# Patient Record
Sex: MALE | Race: WHITE | NOT HISPANIC OR LATINO | URBAN - METROPOLITAN AREA
[De-identification: names, ages, dates, MRNs, and addresses within clinical notes are randomized per-mention and may not be internally consistent; named-entity substitution may affect disease eponyms.]

---

## 2017-01-17 ENCOUNTER — EMERGENCY (EMERGENCY)
Facility: HOSPITAL | Age: 82
LOS: 1 days | End: 2017-01-17
Payer: MEDICARE

## 2017-01-17 PROCEDURE — 74176 CT ABD & PELVIS W/O CONTRAST: CPT | Mod: 26

## 2017-01-17 PROCEDURE — 99284 EMERGENCY DEPT VISIT MOD MDM: CPT

## 2018-11-20 ENCOUNTER — OUTPATIENT (OUTPATIENT)
Dept: OUTPATIENT SERVICES | Facility: HOSPITAL | Age: 83
LOS: 1 days | End: 2018-11-20

## 2019-02-26 ENCOUNTER — OUTPATIENT (OUTPATIENT)
Dept: OUTPATIENT SERVICES | Facility: HOSPITAL | Age: 84
LOS: 1 days | End: 2019-02-26

## 2019-06-20 ENCOUNTER — OUTPATIENT (OUTPATIENT)
Dept: OUTPATIENT SERVICES | Facility: HOSPITAL | Age: 84
LOS: 1 days | End: 2019-06-20

## 2019-09-05 ENCOUNTER — EMERGENCY (EMERGENCY)
Facility: HOSPITAL | Age: 84
LOS: 1 days | End: 2019-09-05
Admitting: EMERGENCY MEDICINE

## 2020-07-24 ENCOUNTER — OUTPATIENT (OUTPATIENT)
Dept: OUTPATIENT SERVICES | Facility: HOSPITAL | Age: 85
LOS: 1 days | End: 2020-07-24

## 2020-10-22 ENCOUNTER — OUTPATIENT (OUTPATIENT)
Dept: OUTPATIENT SERVICES | Facility: HOSPITAL | Age: 85
LOS: 1 days | End: 2020-10-22

## 2021-02-09 ENCOUNTER — OUTPATIENT (OUTPATIENT)
Dept: OUTPATIENT SERVICES | Facility: HOSPITAL | Age: 86
LOS: 1 days | End: 2021-02-09

## 2021-07-28 ENCOUNTER — APPOINTMENT (OUTPATIENT)
Dept: OPHTHALMOLOGY | Facility: CLINIC | Age: 86
End: 2021-07-28

## 2021-08-17 ENCOUNTER — OUTPATIENT (OUTPATIENT)
Dept: OUTPATIENT SERVICES | Facility: HOSPITAL | Age: 86
LOS: 1 days | End: 2021-08-17

## 2021-08-17 PROBLEM — Z00.00 ENCOUNTER FOR PREVENTIVE HEALTH EXAMINATION: Status: ACTIVE | Noted: 2021-08-17

## 2021-08-23 ENCOUNTER — APPOINTMENT (OUTPATIENT)
Dept: CT IMAGING | Facility: CLINIC | Age: 86
End: 2021-08-23
Payer: MEDICARE

## 2021-08-23 PROCEDURE — 70450 CT HEAD/BRAIN W/O DYE: CPT

## 2021-08-25 ENCOUNTER — NON-APPOINTMENT (OUTPATIENT)
Age: 86
End: 2021-08-25

## 2021-08-25 ENCOUNTER — APPOINTMENT (OUTPATIENT)
Dept: CARDIOLOGY | Facility: CLINIC | Age: 86
End: 2021-08-25
Payer: MEDICARE

## 2021-08-25 VITALS
OXYGEN SATURATION: 96 % | DIASTOLIC BLOOD PRESSURE: 62 MMHG | HEIGHT: 67 IN | HEART RATE: 88 BPM | WEIGHT: 201 LBS | SYSTOLIC BLOOD PRESSURE: 110 MMHG | BODY MASS INDEX: 31.55 KG/M2

## 2021-08-25 VITALS — DIASTOLIC BLOOD PRESSURE: 62 MMHG | SYSTOLIC BLOOD PRESSURE: 112 MMHG

## 2021-08-25 DIAGNOSIS — F32.9 MAJOR DEPRESSIVE DISORDER, SINGLE EPISODE, UNSPECIFIED: ICD-10-CM

## 2021-08-25 DIAGNOSIS — Z82.49 FAMILY HISTORY OF ISCHEMIC HEART DISEASE AND OTHER DISEASES OF THE CIRCULATORY SYSTEM: ICD-10-CM

## 2021-08-25 DIAGNOSIS — Z78.9 OTHER SPECIFIED HEALTH STATUS: ICD-10-CM

## 2021-08-25 PROCEDURE — 93000 ELECTROCARDIOGRAM COMPLETE: CPT

## 2021-08-25 PROCEDURE — 99205 OFFICE O/P NEW HI 60 MIN: CPT

## 2021-08-25 RX ORDER — SIMVASTATIN 20 MG/1
20 TABLET, FILM COATED ORAL
Qty: 90 | Refills: 0 | Status: DISCONTINUED | COMMUNITY
End: 2021-08-25

## 2021-08-25 NOTE — HISTORY OF PRESENT ILLNESS
[FreeTextEntry1] : \par 85 yo M with hx as detailed in active problem list here for initial outpatient cv evaluation for near syncope. details in HPI. Had GI virus 2021 and was hypovolemic and lightheadedness no syncope. 3 times have been when standing up from sitting except one time was during walking when hot outside. Now hydrating and since that has not had any recurrence last episode 4 weeks ago. needing alfluzosin for BPH. was fatigued now improved. intermittent dyspnea.\par \par \par ORTHOSTATICS TODAY:\par Supine 130/62 HR 74 \par Sittin/64 HR 78\par Standin/60 HR 78\par \par \par OTHER TESTI/NG:\par reports carotid duplex unremarkable\par Labs 21: grossly normal CMP/ESR/CMP\par reports a1c 6.0. tchol 174. trig elevated 10/2020\par EKG LVH PACs NSR\par CT brain without significant abnormality. age related changes

## 2021-08-25 NOTE — DISCUSSION/SUMMARY
[FreeTextEntry1] : \par 85 yo M with hx as detailed in active problem list here for initial outpatient cv evaluation for near syncope. details in HPI. intermittent dyspnea.\par \par \par # Lightheadedness/near syncope: in setting of hypovolemia each time. orthostatic in nature. today orthostatics normal when hydrated\par - I will obtain a TTE to rule out structural CV abnormality\par - needs alfuzosin; understands alpha 1 blockade effects\par \par # Dyspnea:\par - TTE\par - NST\par \par # HTN: well controlled. if persistent sx, can stop HCTZ component. orthostatics normal. stay hydrated.\par \par # HLD with uncontrolled triglycerides:\par - switch simva 20 to atorva 40, recheck in 3-6 months\par - diet/weight loss/discuss home sleep study\par \par # NIDDM2: very well controlled. continue regimen\par \par \par Return after testing. ER precautions given to patient.\par

## 2021-08-25 NOTE — PHYSICAL EXAM
[Well Developed] : well developed [Well Nourished] : well nourished [No Acute Distress] : no acute distress [Normal Conjunctiva] : normal conjunctiva [Normal Venous Pressure] : normal venous pressure [No Carotid Bruit] : no carotid bruit [Normal S1, S2] : normal S1, S2 [No Rub] : no rub [No Gallop] : no gallop [Clear Lung Fields] : clear lung fields [Good Air Entry] : good air entry [No Respiratory Distress] : no respiratory distress  [Soft] : abdomen soft [Non Tender] : non-tender [Normal Bowel Sounds] : normal bowel sounds [Normal Gait] : normal gait [No Edema] : no edema [No Cyanosis] : no cyanosis [No Clubbing] : no clubbing [No Varicosities] : no varicosities [No Rash] : no rash [No Skin Lesions] : no skin lesions [Moves all extremities] : moves all extremities [No Focal Deficits] : no focal deficits [Normal Speech] : normal speech [Alert and Oriented] : alert and oriented [Normal memory] : normal memory [de-identified] : MEG

## 2021-09-10 ENCOUNTER — APPOINTMENT (OUTPATIENT)
Dept: CARDIOLOGY | Facility: CLINIC | Age: 86
End: 2021-09-10
Payer: MEDICARE

## 2021-09-10 PROCEDURE — 93015 CV STRESS TEST SUPVJ I&R: CPT

## 2021-09-10 PROCEDURE — 78452 HT MUSCLE IMAGE SPECT MULT: CPT

## 2021-09-10 PROCEDURE — A9502: CPT

## 2021-09-10 PROCEDURE — 93306 TTE W/DOPPLER COMPLETE: CPT

## 2021-09-13 ENCOUNTER — NON-APPOINTMENT (OUTPATIENT)
Age: 86
End: 2021-09-13

## 2021-09-20 ENCOUNTER — APPOINTMENT (OUTPATIENT)
Dept: CARDIOLOGY | Facility: CLINIC | Age: 86
End: 2021-09-20
Payer: MEDICARE

## 2021-09-20 VITALS
DIASTOLIC BLOOD PRESSURE: 66 MMHG | BODY MASS INDEX: 32.18 KG/M2 | WEIGHT: 205 LBS | OXYGEN SATURATION: 97 % | SYSTOLIC BLOOD PRESSURE: 124 MMHG | HEART RATE: 65 BPM | HEIGHT: 67 IN | TEMPERATURE: 96.9 F | RESPIRATION RATE: 14 BRPM

## 2021-09-20 PROCEDURE — 99215 OFFICE O/P EST HI 40 MIN: CPT

## 2021-09-20 RX ORDER — CHOLESTYRAMINE 4 G/9G
4 POWDER, FOR SUSPENSION ORAL 3 TIMES DAILY
Refills: 0 | Status: ACTIVE | COMMUNITY

## 2021-09-20 NOTE — PHYSICAL EXAM
[Well Developed] : well developed [Well Nourished] : well nourished [No Acute Distress] : no acute distress [Normal Conjunctiva] : normal conjunctiva [Normal Venous Pressure] : normal venous pressure [Normal S1, S2] : normal S1, S2 [No Rub] : no rub [Clear Lung Fields] : clear lung fields [Good Air Entry] : good air entry [No Respiratory Distress] : no respiratory distress  [Soft] : abdomen soft [Non Tender] : non-tender [Normal Bowel Sounds] : normal bowel sounds [Normal Gait] : normal gait [No Edema] : no edema [No Cyanosis] : no cyanosis [No Clubbing] : no clubbing [No Varicosities] : no varicosities [No Rash] : no rash [No Skin Lesions] : no skin lesions [Moves all extremities] : moves all extremities [No Focal Deficits] : no focal deficits [Normal Speech] : normal speech [Alert and Oriented] : alert and oriented [Normal memory] : normal memory [de-identified] : MEG

## 2021-09-20 NOTE — HISTORY OF PRESENT ILLNESS
[FreeTextEntry1] : \par 87 yo M with hx as detailed in active problem list here for results follow up.\par \par Testing significant for: \par labwork: trig 416 unable to calculate ldl. a1c 6. normal tft and bnp\par 2021 echo and nst without evidence of structural cv abnormality or ischemia. g1dd\par \par no recurrence of near syncope after stopping/decreasing afluzosin. \par \par \par In past "Had GI virus 2021 and was hypovolemic and lightheadedness no syncope. 3 times have been when standing up from sitting except one time was during walking when hot outside. Now hydrating and since that has not had any recurrence last episode 4 weeks ago. needing alfluzosin for BPH. was fatigued now improved. intermittent dyspnea."\par \par \par ORTHOSTATICS LAST VISIT:\par Supine 130/62 HR 74 \par Sittin/64 HR 78\par Standin/60 HR 78\par \par \par OTHER TESTI/NG:\par labwork: trig 416 unable to calculate ldl. a1c 6. normal tft and bnp\par 2021 echo and nst without evidence of structural cv abnormality or ischemia. g1dd\par Labs 21: grossly normal CMP/ESR/CMP\par reports a1c 6.0. tchol 174. trig elevated 10/2020\par EKG LVH PACs NSR\par CT brain without significant abnormality. age related changes

## 2021-09-20 NOTE — DISCUSSION/SUMMARY
[FreeTextEntry1] : \par 85 yo M with hx as detailed in active problem list here for results follow up.\par labwork: trig 416 unable to calculate ldl. a1c 6. normal tft and bnp\par 9/2021 echo and nst without evidence of structural cv abnormality or ischemia. g1dd\par no recurrence of near syncope after stopping/decreasing afluzosin. \par \par \par # HLD with uncontrolled triglycerides:\par - switched to atorva 40 and added fenofibrate 160 and recheck in 3 months. aspirin 81/ppi.\par - diet/weight loss/discuss home sleep study\par \par # Lightheadedness/near syncope: in setting of hypovolemia each time. orthostatic in nature. orthostatics normal when hydrated\par - decreased afluzosin; understands alpha 1 blockade effects\par \par # HTN: well controlled. if persistent sx, can stop HCTZ component. orthostatics normal. stay hydrated.\par \par # NIDDM2: very well controlled. continue regimen\par \par \par Return in 3 months with labwork. ER precautions given to patient.\par

## 2021-12-06 ENCOUNTER — NON-APPOINTMENT (OUTPATIENT)
Age: 86
End: 2021-12-06

## 2021-12-06 ENCOUNTER — APPOINTMENT (OUTPATIENT)
Dept: CARDIOLOGY | Facility: CLINIC | Age: 86
End: 2021-12-06
Payer: MEDICARE

## 2021-12-06 VITALS
TEMPERATURE: 97.1 F | OXYGEN SATURATION: 97 % | DIASTOLIC BLOOD PRESSURE: 50 MMHG | HEIGHT: 67 IN | HEART RATE: 62 BPM | BODY MASS INDEX: 31.39 KG/M2 | SYSTOLIC BLOOD PRESSURE: 102 MMHG | WEIGHT: 200 LBS

## 2021-12-06 PROCEDURE — 99214 OFFICE O/P EST MOD 30 MIN: CPT

## 2021-12-06 PROCEDURE — 93880 EXTRACRANIAL BILAT STUDY: CPT

## 2021-12-06 RX ORDER — CIPROFLOXACIN HYDROCHLORIDE 250 MG/1
250 TABLET, FILM COATED ORAL
Qty: 10 | Refills: 0 | Status: DISCONTINUED | COMMUNITY
Start: 2021-06-22 | End: 2021-12-06

## 2021-12-06 RX ORDER — CITALOPRAM 20 MG/1
20 TABLET, FILM COATED ORAL DAILY
Refills: 0 | Status: ACTIVE | COMMUNITY

## 2021-12-06 RX ORDER — FINASTERIDE 5 MG/1
5 TABLET, FILM COATED ORAL DAILY
Refills: 0 | Status: ACTIVE | COMMUNITY
Start: 2021-11-24

## 2021-12-06 RX ORDER — LOSARTAN POTASSIUM AND HYDROCHLOROTHIAZIDE 12.5; 1 MG/1; MG/1
100-12.5 TABLET ORAL DAILY
Refills: 0 | Status: DISCONTINUED | COMMUNITY
End: 2021-12-06

## 2021-12-06 RX ORDER — PAROXETINE HYDROCHLORIDE 20 MG/1
20 TABLET, FILM COATED ORAL DAILY
Refills: 0 | Status: DISCONTINUED | COMMUNITY
End: 2021-12-06

## 2021-12-06 RX ORDER — ALFUZOSIN HYDROCHLORIDE 10 MG/1
10 TABLET, EXTENDED RELEASE ORAL DAILY
Refills: 0 | Status: DISCONTINUED | COMMUNITY
End: 2021-12-06

## 2021-12-06 NOTE — PHYSICAL EXAM
[Well Developed] : well developed [Well Nourished] : well nourished [No Acute Distress] : no acute distress [Normal Conjunctiva] : normal conjunctiva [Normal Venous Pressure] : normal venous pressure [Normal S1, S2] : normal S1, S2 [No Rub] : no rub [Clear Lung Fields] : clear lung fields [Good Air Entry] : good air entry [No Respiratory Distress] : no respiratory distress  [Soft] : abdomen soft [Non Tender] : non-tender [Normal Bowel Sounds] : normal bowel sounds [Normal Gait] : normal gait [No Edema] : no edema [No Cyanosis] : no cyanosis [No Clubbing] : no clubbing [No Varicosities] : no varicosities [No Rash] : no rash [No Skin Lesions] : no skin lesions [Moves all extremities] : moves all extremities [No Focal Deficits] : no focal deficits [Normal Speech] : normal speech [Alert and Oriented] : alert and oriented [Normal memory] : normal memory [de-identified] : MEG

## 2021-12-06 NOTE — HISTORY OF PRESENT ILLNESS
[FreeTextEntry1] : \par 85 yo M with hx as detailed in active problem list here for 3-month follow-up.\par \par Surveillance carotid ultrasound today: inadequate study, tortuous\par Interim lab work: much improved lipid profile. cr 1.1. normal lfts. LDL 58.  Intermittently has near syncopal sensation without any cardiovascular symptoms preceding or post.  No syncope or fall.\par \par He initially was seen for near syncope which resolved after stopping/decreasing afluzosin.  Orthostatics resolved after this.\par \par In past "Had GI virus 7/2021 and was hypovolemic and lightheadedness no syncope. 3 times have been when standing up from sitting except one time was during walking when hot outside. Now hydrating and since that has not had any recurrence last episode 4 weeks ago. needing alfluzosin for BPH. was fatigued now improved. intermittent dyspnea."\par \par \par OTHER TESTI/NG:\par Lab work 12/2021: Creatinine 1.1.  Normal LFT.  A1c 6.4.  Triglycerides 140.  LDL 58.\par 12/21 Carotid ultrasound: poor \par labwork: trig 416 unable to calculate ldl. a1c 6. normal tft and bnp\par 9/2021 echo and nst without evidence of structural cv abnormality or ischemia. g1dd\par Labs 8/17/21: grossly normal CMP/ESR/CMP\par reports a1c 6.0. tchol 174. trig elevated 10/2020\par EKG LVH PACs NSR\par CT brain without significant abnormality. age related changes

## 2021-12-06 NOTE — DISCUSSION/SUMMARY
[FreeTextEntry1] : \par 85 yo M with hx as detailed in active problem list here for 3-month follow-up.\par \par # HLD, hypertriglyceridemia: Prior uncontrolled now improved\par -Continue the recently switched atorva 40 and fenofibrate 160. aspirin 81/ppi.\par - diet/weight loss\par -Ordered home sleep study\par \par # Lightheadedness/near syncope: in setting of hypovolemia each time. orthostatic in nature. orthostatics normal when hydrated\par -Stop afluzoin; understands alpha 1 blockade effects\par -Ordered 14-day Zio patch although symptoms occur very rarely\par -Stop hydrochlorothiazide\par \par # HTN: well controlled.  Stop hydrochlorothiazide with combination of losartan 100.  We will continue losartan 100 monotherapy.  Orthostatics normal. stay hydrated.\par \par # NIDDM2: very well controlled. continue regimen\par \par \par Return in 6 months.  Follow patch and sleep study results in interim. Forward note to PCP Dr. Mai.\par

## 2021-12-15 ENCOUNTER — APPOINTMENT (OUTPATIENT)
Age: 86
End: 2021-12-15
Payer: MEDICARE

## 2021-12-15 ENCOUNTER — OUTPATIENT (OUTPATIENT)
Dept: OUTPATIENT SERVICES | Facility: HOSPITAL | Age: 86
LOS: 1 days | End: 2021-12-15
Payer: MEDICARE

## 2021-12-15 DIAGNOSIS — G47.33 OBSTRUCTIVE SLEEP APNEA (ADULT) (PEDIATRIC): ICD-10-CM

## 2021-12-15 PROCEDURE — G0399: CPT

## 2021-12-15 PROCEDURE — 95806 SLEEP STUDY UNATT&RESP EFFT: CPT

## 2021-12-15 PROCEDURE — ZZZZZ: CPT

## 2021-12-29 ENCOUNTER — NON-APPOINTMENT (OUTPATIENT)
Age: 86
End: 2021-12-29

## 2022-01-05 ENCOUNTER — APPOINTMENT (OUTPATIENT)
Dept: ELECTROPHYSIOLOGY | Facility: CLINIC | Age: 87
End: 2022-01-05
Payer: MEDICARE

## 2022-01-05 ENCOUNTER — NON-APPOINTMENT (OUTPATIENT)
Age: 87
End: 2022-01-05

## 2022-01-05 VITALS
WEIGHT: 196 LBS | BODY MASS INDEX: 30.76 KG/M2 | HEART RATE: 90 BPM | OXYGEN SATURATION: 97 % | SYSTOLIC BLOOD PRESSURE: 152 MMHG | TEMPERATURE: 97.1 F | DIASTOLIC BLOOD PRESSURE: 70 MMHG | HEIGHT: 67 IN

## 2022-01-05 PROCEDURE — 93000 ELECTROCARDIOGRAM COMPLETE: CPT

## 2022-01-05 PROCEDURE — 99204 OFFICE O/P NEW MOD 45 MIN: CPT

## 2022-01-05 RX ORDER — CITALOPRAM HYDROBROMIDE 10 MG/1
10 TABLET, FILM COATED ORAL DAILY
Refills: 0 | Status: DISCONTINUED | COMMUNITY
Start: 2021-11-29 | End: 2022-01-05

## 2022-01-05 NOTE — PHYSICAL EXAM
[No Acute Distress] : no acute distress [Normal Conjunctiva] : normal conjunctiva [Normal Venous Pressure] : normal venous pressure [Normal S1, S2] : normal S1, S2 [No Murmur] : no murmur [No Rub] : no rub [Clear Lung Fields] : clear lung fields [Good Air Entry] : good air entry [Non Tender] : non-tender [No Edema] : no edema [No Cyanosis] : no cyanosis [No Clubbing] : no clubbing [No Rash] : no rash [No Focal Deficits] : no focal deficits [Alert and Oriented] : alert and oriented

## 2022-01-05 NOTE — DISCUSSION/SUMMARY
[FreeTextEntry1] : Patient is overall feeling well without any symptoms of chest pain, shortness of breath or syncope.  He has this occasional dizziness/lightheadedness when he stands up too quickly and this is improved since discontinuation of diuretic.  He has normal left ventricular function and no evidence of ischemia on a nuclear stress test.\par \par The patient does not complain of palpitations.  He had a Zio patch monitor that showed episodes of tachycardia rate of 180 bpm that appears to be an atrial tachycardia.  He also had short episodes of nonsustained wide-complex tachycardia that slightly different from his baseline morphology and appear to be ventricular.  He is on metoprolol 12.5 mg/day.\par \par We will increase the dose of his metoprolol to 25 mg daily.  We will be mindful of him developing bradycardia.  Patient does not feel the tachycardia episodes at this point and was alerted regarding symptoms.  He has preserved left ventricular function and normal myocardial perfusion scan and is at low risk for sustained VT.\par \par The patient was instructed that if he should have symptoms such as worsening dizziness or lightheadedness to report as which case we might have to consider either pacemaker and/or electrophysiology study and catheter ablation.  At this time the patient would prefer not to have anything invasive done.\par \par

## 2022-01-05 NOTE — HISTORY OF PRESENT ILLNESS
[FreeTextEntry1] : Patient is an 86-year-old man who was seen in evaluation for an NSVT.\par \par He has had a prior history of hypertension for which she is on losartan.  He was previously on hydrochlorothiazide which was discontinued.  He also had a history of diabetes and is on Metformin.\par \par In the past the patient had symptoms of dizziness/lightheadedness when he stands up.  It occurs for very brief moment.  Never occurs while he is sitting or laying flat.  The symptom has improved significantly over the last few months.  His diuretic was discontinued.  He was previously noted to be orthostatic.\par \par He denies any symptoms of chest pain, shortness of breath or palpitations.\par \par He claims his symptoms gotten better since he has learned how to get up slowly.\par \par The patient had an echocardiogram performed on 9/10/2021 that showed EF 55 to 60%, left atrial diameter 4.5 cm with left atrial volume index 36 cc/m², mild mitral regurgitation, mild diastolic dysfunction no pericardial effusion.\par \par He had a previous stress test performed 9/10/2021 no ECG evidence of ischemia and normal myocardial perfusion scan.\par \par The patient had a Zio patch monitor performed for 13 days starting 12/6/2021 to 12/20/2021: It showed heart rates ranging from 47 bpm 297 bpm.  He has had episodes of a nonsustained wide-complex tachycardia rate of 180 bpm.  He is also had another tachycardia that appears to be similar to his baseline rhythm with rates 180 bpm.\par

## 2022-01-05 NOTE — CARDIOLOGY SUMMARY
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

## 2022-01-05 NOTE — REVIEW OF SYSTEMS
[Dizziness] : dizziness [Weight Gain (___ Lbs)] : no recent weight gain [Blurry Vision] : no blurred vision [Sore Throat] : no sore throat [SOB] : no shortness of breath [Dyspnea on exertion] : not dyspnea during exertion [Chest Discomfort] : no chest discomfort [Palpitations] : no palpitations [Syncope] : no syncope [Cough] : no cough [Abdominal Pain] : no abdominal pain [Rash] : no rash [Confusion] : no confusion was observed [Under Stress] : not under stress [Easy Bleeding] : no tendency for easy bleeding

## 2022-04-04 ENCOUNTER — APPOINTMENT (OUTPATIENT)
Dept: CARDIOLOGY | Facility: CLINIC | Age: 87
End: 2022-04-04
Payer: MEDICARE

## 2022-04-04 VITALS
TEMPERATURE: 97.1 F | WEIGHT: 200 LBS | DIASTOLIC BLOOD PRESSURE: 58 MMHG | HEART RATE: 72 BPM | SYSTOLIC BLOOD PRESSURE: 102 MMHG | OXYGEN SATURATION: 95 % | BODY MASS INDEX: 31.39 KG/M2 | HEIGHT: 67 IN

## 2022-04-04 PROCEDURE — 99214 OFFICE O/P EST MOD 30 MIN: CPT

## 2022-04-04 RX ORDER — OMEPRAZOLE 20 MG/1
20 CAPSULE, DELAYED RELEASE ORAL DAILY
Refills: 0 | Status: ACTIVE | COMMUNITY

## 2022-04-04 RX ORDER — FENOFIBRATE 160 MG/1
160 TABLET ORAL
Qty: 90 | Refills: 3 | Status: ACTIVE | COMMUNITY
Start: 2021-09-20

## 2022-04-04 RX ORDER — METFORMIN HYDROCHLORIDE 500 MG/1
500 TABLET, COATED ORAL
Qty: 180 | Refills: 1 | Status: ACTIVE | COMMUNITY

## 2022-04-04 RX ORDER — ATORVASTATIN CALCIUM 40 MG/1
40 TABLET, FILM COATED ORAL
Qty: 90 | Refills: 3 | Status: ACTIVE | COMMUNITY
Start: 2021-08-25

## 2022-04-04 NOTE — PHYSICAL EXAM
[Well Developed] : well developed [Well Nourished] : well nourished [No Acute Distress] : no acute distress [Normal Conjunctiva] : normal conjunctiva [Normal Venous Pressure] : normal venous pressure [Normal S1, S2] : normal S1, S2 [No Rub] : no rub [Clear Lung Fields] : clear lung fields [Good Air Entry] : good air entry [No Respiratory Distress] : no respiratory distress  [Soft] : abdomen soft [Non Tender] : non-tender [Normal Bowel Sounds] : normal bowel sounds [Normal Gait] : normal gait [No Edema] : no edema [No Cyanosis] : no cyanosis [No Clubbing] : no clubbing [No Varicosities] : no varicosities [No Rash] : no rash [No Skin Lesions] : no skin lesions [Moves all extremities] : moves all extremities [No Focal Deficits] : no focal deficits [Normal Speech] : normal speech [Alert and Oriented] : alert and oriented [Normal memory] : normal memory [de-identified] : MEG

## 2022-04-04 NOTE — HISTORY OF PRESENT ILLNESS
[FreeTextEntry1] : \par 88 yo M\par \par 4/2022 VISIT: interim labwork done. seeing sleep medicine for cpap. pending to start. no more dizziness. on bb. saw EP. no angina. \par \par 12/2021 VISIT: "episode of dizziness correlated with a normal heart rhythm which is good news.  He did have some asymptomatic fast heart rates that I spoke to the electrophysiology and they would like to see him within a couple weeks.  I tasked Xiomara. To control his fast heart rates intermittently I recommend starting low-dose metoprolol at 12.5 and lower the losartan to 50 so his blood pressure does not drop.  Otherwise no change in plan.  I sent in both those prescription changes. See sleep medicine doctor and start CPAP."\par \par \par TESTING I REVIEWED TODAY:\par 3/2022 LABWORK: cr 1.14. normal cmp. a1c 5.8. LDL 59. trig improved to 141. \par \par 12/2021: \par Surveillance carotid ultrasound: inadequate study, tortuous\par Interim lab work: much improved lipid profile. cr 1.1. normal lfts. LDL 58.  Intermittently has near syncopal sensation without any cardiovascular symptoms preceding or post.  No syncope or fall.\par \par He initially was seen for near syncope which resolved after stopping/decreasing afluzosin.  Orthostatics resolved after this.\par \par In past "Had GI virus 7/2021 and was hypovolemic and lightheadedness no syncope. 3 times have been when standing up from sitting except one time was during walking when hot outside. Now hydrating and since that has not had any recurrence last episode 4 weeks ago. needing alfluzosin for BPH. was fatigued now improved. intermittent dyspnea."\par \par \par OTHER TESTI/NG:\par Lab work 12/2021: Creatinine 1.1.  Normal LFT.  A1c 6.4.  Triglycerides 140.  LDL 58.\par 12/21 Carotid ultrasound: poor \par labwork: trig 416 unable to calculate ldl. a1c 6. normal tft and bnp\par 9/2021 echo and nst without evidence of structural cv abnormality or ischemia. g1dd\par Labs 8/17/21: grossly normal CMP/ESR/CMP\par reports a1c 6.0. tchol 174. trig elevated 10/2020\par EKG LVH PACs NSR\par CT brain without significant abnormality. age related changes

## 2022-04-04 NOTE — DISCUSSION/SUMMARY
[FreeTextEntry1] : \par 86 yo M\par 4/2022 VISIT: interim labwork done. seeing sleep medicine for cpap. pending to start. no more dizziness. on bb. saw EP. no angina. \par \par \par # GOLDIE, HLD, hypertriglyceridemia: Prior uncontrolled now very optimal. trig was 470+ now 141. \par -Continue the recently switched atorva 40 and fenofibrate 160. aspirin 81/ppi.\par - diet/weight loss\par - seeing Dr. Rosales for CPAP initiation, has GOLDIE \par \par # Lightheadedness/near syncope now resolved: in setting of hypovolemia each time. orthostatic in nature. orthostatics normal when hydrated\par -Stopped afluzoin; understands alpha 1 blockade effects\par -seeing EP\par -Stopped prior hydrochlorothiazide\par \par # HTN: well controlled.  Stopped hydrochlorothiazide with combination of losartan 100.  We will continue losartan 100 and toprol 25.  Orthostatics normal. stay hydrated.\par \par # NIDDM2: very well controlled. continue regimen\par \par \par Return in 6 months.  Forward note to PCP Dr. Mai.\par

## 2022-05-13 ENCOUNTER — APPOINTMENT (OUTPATIENT)
Dept: RADIOLOGY | Facility: CLINIC | Age: 87
End: 2022-05-13
Payer: MEDICARE

## 2022-05-13 PROCEDURE — 71046 X-RAY EXAM CHEST 2 VIEWS: CPT

## 2022-05-19 ENCOUNTER — NON-APPOINTMENT (OUTPATIENT)
Age: 87
End: 2022-05-19

## 2022-06-06 ENCOUNTER — APPOINTMENT (OUTPATIENT)
Dept: CARDIOLOGY | Facility: CLINIC | Age: 87
End: 2022-06-06
Payer: MEDICARE

## 2022-06-06 VITALS
HEIGHT: 67 IN | OXYGEN SATURATION: 96 % | BODY MASS INDEX: 31.08 KG/M2 | HEART RATE: 60 BPM | DIASTOLIC BLOOD PRESSURE: 74 MMHG | TEMPERATURE: 96.3 F | WEIGHT: 198 LBS | SYSTOLIC BLOOD PRESSURE: 164 MMHG

## 2022-06-06 PROCEDURE — 99214 OFFICE O/P EST MOD 30 MIN: CPT

## 2022-06-10 NOTE — PHYSICAL EXAM
[Well Developed] : well developed [Well Nourished] : well nourished [No Acute Distress] : no acute distress [Normal Conjunctiva] : normal conjunctiva [Normal Venous Pressure] : normal venous pressure [Normal S1, S2] : normal S1, S2 [No Rub] : no rub [Clear Lung Fields] : clear lung fields [Good Air Entry] : good air entry [No Respiratory Distress] : no respiratory distress  [Soft] : abdomen soft [Non Tender] : non-tender [Normal Bowel Sounds] : normal bowel sounds [Normal Gait] : normal gait [No Edema] : no edema [No Cyanosis] : no cyanosis [No Clubbing] : no clubbing [No Varicosities] : no varicosities [No Rash] : no rash [No Skin Lesions] : no skin lesions [Moves all extremities] : moves all extremities [No Focal Deficits] : no focal deficits [Normal Speech] : normal speech [Alert and Oriented] : alert and oriented [Normal memory] : normal memory [de-identified] : MEG

## 2022-06-10 NOTE — DISCUSSION/SUMMARY
[FreeTextEntry1] : \par # GOLDIE, HLD, hypertriglyceridemia: Prior uncontrolled now very optimal. trig was 470+ now 141. \par -Continue the recently switched atorva 40 and fenofibrate 160. aspirin 81/ppi. discss stopping aspirin. \par - diet/weight loss\par - seeing Dr. Rosales for CPAP initiation, has GOLDIE, reports it is on backorder\par \par # Lightheadedness/near syncope now resolved: in setting of hypovolemia each time. orthostatic in nature. orthostatics normal when hydrated\par -Stopped afluzoin; understands alpha 1 blockade effects\par -seeing EP\par -Stopped prior hydrochlorothiazide\par \par # HTN: labile.\par - do losartan 50 BID instead of 50 qd\par - off diuretics and pure alpha blockers of any sort\par - keep toprol 25.  Orthostatics normal\par \par # NIDDM2: very well controlled. continue regimen\par \par \par return in 1 month for bp checks. ER precautions given to patient.\par \par

## 2022-06-10 NOTE — HISTORY OF PRESENT ILLNESS
[FreeTextEntry1] : \par 86 yo M\par \par 6/2022: no angina. reports BP is labile. not started cpap, on backorder\par  \par 4/2022 VISIT: interim labwork done. seeing sleep medicine for cpap. pending to start. no more dizziness. on bb. saw EP. no angina. \par \par 12/2021 VISIT: "episode of dizziness correlated with a normal heart rhythm which is good news.  He did have some asymptomatic fast heart rates that I spoke to the electrophysiology and they would like to see him within a couple weeks.  I tasked Xiomara. To control his fast heart rates intermittently I recommend starting low-dose metoprolol at 12.5 and lower the losartan to 50 so his blood pressure does not drop.  Otherwise no change in plan.  I sent in both those prescription changes. See sleep medicine doctor and start CPAP."\par He initially was seen for near syncope which resolved after stopping/decreasing afluzosin.  Orthostatics resolved after this.\par \par \par \par TESTING I REVIEWED TODAY:\par 3/2022 LABWORK: cr 1.14. normal cmp. a1c 5.8. LDL 59. trig improved to 141. \par \par Lab work 12/2021: Creatinine 1.1.  Normal LFT.  A1c 6.4.  Triglycerides 140.  LDL 58.\par 12/21 Carotid ultrasound: poor \par labwork: trig 416 unable to calculate ldl. a1c 6. normal tft and bnp\par 9/2021 echo and nst without evidence of structural cv abnormality or ischemia. g1dd\par Labs 8/17/21: grossly normal CMP/ESR/CMP\par reports a1c 6.0. tchol 174. trig elevated 10/2020\par EKG LVH PACs NSR\par CT brain without significant abnormality. age related changes\par

## 2022-06-28 ENCOUNTER — INPATIENT (INPATIENT)
Facility: HOSPITAL | Age: 87
LOS: 0 days | Discharge: HOME | End: 2022-06-29
Attending: INTERNAL MEDICINE | Admitting: EMERGENCY MEDICINE
Payer: MEDICARE

## 2022-06-28 ENCOUNTER — OUTPATIENT (OUTPATIENT)
Dept: OUTPATIENT SERVICES | Facility: HOSPITAL | Age: 87
LOS: 1 days | End: 2022-06-28

## 2022-06-28 PROCEDURE — 99285 EMERGENCY DEPT VISIT HI MDM: CPT

## 2022-06-28 PROCEDURE — 71045 X-RAY EXAM CHEST 1 VIEW: CPT | Mod: 26

## 2022-06-28 PROCEDURE — 93010 ELECTROCARDIOGRAM REPORT: CPT | Mod: 76

## 2022-06-28 PROCEDURE — 99223 1ST HOSP IP/OBS HIGH 75: CPT

## 2022-06-28 PROCEDURE — 70450 CT HEAD/BRAIN W/O DYE: CPT | Mod: 26,MA

## 2022-06-28 PROCEDURE — 72125 CT NECK SPINE W/O DYE: CPT | Mod: 26,MA

## 2022-06-29 ENCOUNTER — OUTPATIENT (OUTPATIENT)
Dept: OUTPATIENT SERVICES | Facility: HOSPITAL | Age: 87
LOS: 1 days | End: 2022-06-29

## 2022-06-29 PROCEDURE — 93010 ELECTROCARDIOGRAM REPORT: CPT

## 2022-06-29 PROCEDURE — 33285 INSJ SUBQ CAR RHYTHM MNTR: CPT

## 2022-06-29 PROCEDURE — 93306 TTE W/DOPPLER COMPLETE: CPT | Mod: 26

## 2022-07-06 DIAGNOSIS — Z87.891 PERSONAL HISTORY OF NICOTINE DEPENDENCE: ICD-10-CM

## 2022-07-06 DIAGNOSIS — E86.0 DEHYDRATION: ICD-10-CM

## 2022-07-06 DIAGNOSIS — E83.42 HYPOMAGNESEMIA: ICD-10-CM

## 2022-07-06 DIAGNOSIS — E78.5 HYPERLIPIDEMIA, UNSPECIFIED: ICD-10-CM

## 2022-07-06 DIAGNOSIS — G47.33 OBSTRUCTIVE SLEEP APNEA (ADULT) (PEDIATRIC): ICD-10-CM

## 2022-07-06 DIAGNOSIS — Z82.49 FAMILY HISTORY OF ISCHEMIC HEART DISEASE AND OTHER DISEASES OF THE CIRCULATORY SYSTEM: ICD-10-CM

## 2022-07-06 DIAGNOSIS — Z79.84 LONG TERM (CURRENT) USE OF ORAL HYPOGLYCEMIC DRUGS: ICD-10-CM

## 2022-07-06 DIAGNOSIS — E11.9 TYPE 2 DIABETES MELLITUS WITHOUT COMPLICATIONS: ICD-10-CM

## 2022-07-06 DIAGNOSIS — R55 SYNCOPE AND COLLAPSE: ICD-10-CM

## 2022-07-06 DIAGNOSIS — F32.A DEPRESSION, UNSPECIFIED: ICD-10-CM

## 2022-07-06 DIAGNOSIS — I10 ESSENTIAL (PRIMARY) HYPERTENSION: ICD-10-CM

## 2022-07-06 DIAGNOSIS — K58.9 IRRITABLE BOWEL SYNDROME WITHOUT DIARRHEA: ICD-10-CM

## 2022-07-06 DIAGNOSIS — N40.0 BENIGN PROSTATIC HYPERPLASIA WITHOUT LOWER URINARY TRACT SYMPTOMS: ICD-10-CM

## 2022-07-06 DIAGNOSIS — I47.1 SUPRAVENTRICULAR TACHYCARDIA: ICD-10-CM

## 2022-07-06 DIAGNOSIS — Z83.3 FAMILY HISTORY OF DIABETES MELLITUS: ICD-10-CM

## 2022-07-06 DIAGNOSIS — N17.9 ACUTE KIDNEY FAILURE, UNSPECIFIED: ICD-10-CM

## 2022-07-08 ENCOUNTER — APPOINTMENT (OUTPATIENT)
Dept: CARDIOLOGY | Facility: CLINIC | Age: 87
End: 2022-07-08

## 2022-07-08 VITALS
TEMPERATURE: 97.3 F | WEIGHT: 192 LBS | OXYGEN SATURATION: 95 % | SYSTOLIC BLOOD PRESSURE: 144 MMHG | BODY MASS INDEX: 30.13 KG/M2 | DIASTOLIC BLOOD PRESSURE: 68 MMHG | HEART RATE: 67 BPM | HEIGHT: 67 IN

## 2022-07-08 DIAGNOSIS — R42 DIZZINESS AND GIDDINESS: ICD-10-CM

## 2022-07-08 PROCEDURE — 99215 OFFICE O/P EST HI 40 MIN: CPT

## 2022-07-08 RX ORDER — LOSARTAN POTASSIUM 50 MG/1
50 TABLET, FILM COATED ORAL DAILY
Qty: 90 | Refills: 3 | Status: DISCONTINUED | COMMUNITY
Start: 2021-12-06 | End: 2022-07-08

## 2022-07-08 RX ORDER — LOSARTAN POTASSIUM 50 MG/1
50 TABLET, FILM COATED ORAL TWICE DAILY
Refills: 0 | Status: ACTIVE | COMMUNITY

## 2022-07-08 RX ORDER — ASPIRIN 325 MG
81 TABLET ORAL DAILY
Qty: 90 | Refills: 0 | Status: DISCONTINUED | COMMUNITY
Start: 2021-08-25 | End: 2022-07-08

## 2022-07-08 NOTE — PHYSICAL EXAM
[Well Developed] : well developed [Well Nourished] : well nourished [No Acute Distress] : no acute distress [Normal Conjunctiva] : normal conjunctiva [Normal Venous Pressure] : normal venous pressure [Normal S1, S2] : normal S1, S2 [No Rub] : no rub [Clear Lung Fields] : clear lung fields [Good Air Entry] : good air entry [No Respiratory Distress] : no respiratory distress  [Soft] : abdomen soft [Non Tender] : non-tender [Normal Bowel Sounds] : normal bowel sounds [Normal Gait] : normal gait [No Edema] : no edema [No Cyanosis] : no cyanosis [No Clubbing] : no clubbing [No Varicosities] : no varicosities [No Rash] : no rash [No Skin Lesions] : no skin lesions [Moves all extremities] : moves all extremities [No Focal Deficits] : no focal deficits [Normal Speech] : normal speech [Alert and Oriented] : alert and oriented [Normal memory] : normal memory [de-identified] : MEG

## 2022-07-08 NOTE — DISCUSSION/SUMMARY
[FreeTextEntry1] : \par # GOLDIE, HLD, hypertriglyceridemia: Prior uncontrolled now very optimal. trig was 470+ now 141. \par -Continue the recently switched atorva 40 and fenofibrate 160. stop aspirin. \par - diet/weight loss\par - seeing Dr. Rosales for CPAP initiation, has GOLDIE, reports it is on backorder\par \par # Syncope: in setting of hypovolemia each time this time with SVT. orthostatic in nature. orthostatics normal when hydrated\par -Stopped afluzosin and tamsulosin now. on low dose bb. has ILR now.\par -seeing EP\par -Stopped prior hydrochlorothiazide\par \par # HTN: labile.\par - do losartan 50 BID instead of 50 qd\par - off diuretics and pure alpha blockers of any sort\par - keep toprol 12.5.  Orthostatics normal. bp log. \par \par # NIDDM2: very well controlled. continue regimen\par \par \par Return in 3 months. EP in interim. ER precautions given to patient.\par \par

## 2022-07-08 NOTE — HISTORY OF PRESENT ILLNESS
[FreeTextEntry1] : \par 86 yo M\par \par 7/2022: interim[place] [facility] [place] broke with adenosine and dehydration. off tamsulosin now.  ILR put in. echo structurally unremarkable. \par  \par 6/2022: no angina. reports BP is labile. not started cpap, on backorder\par  \par 4/2022 VISIT: interim labwork done. seeing sleep medicine for cpap. pending to start. no more dizziness. on bb. saw EP. no angina. \par \par 12/2021 VISIT: "episode of dizziness correlated with a normal heart rhythm which is good news.  He did have some asymptomatic fast heart rates that I spoke to the electrophysiology and they would like to see him within a couple weeks.  I tasked Xiomara. To control his fast heart rates intermittently I recommend starting low-dose metoprolol at 12.5 and lower the losartan to 50 so his blood pressure does not drop.  Otherwise no change in plan.  I sent in both those prescription changes. See sleep medicine doctor and start CPAP."\par He initially was seen for near syncope which resolved after stopping/decreasing afluzosin.  Orthostatics resolved after this.\par \par \par \par TESTING I REVIEWED TODAY:\par 6/2022: Cr 1.1. lipids optimized. normal TFT. a1c 5.9. normal cmp. \par TTE structurally unremarkabe \par \par 3/2022 LABWORK: cr 1.14. normal cmp. a1c 5.8. LDL 59. trig improved to 141. \par \par Lab work 12/2021: Creatinine 1.1.  Normal LFT.  A1c 6.4.  Triglycerides 140.  LDL 58.\par 12/21 Carotid ultrasound: poor \par labwork: trig 416 unable to calculate ldl. a1c 6. normal tft and bnp\par 9/2021 echo and nst without evidence of structural cv abnormality or ischemia. g1dd\par Labs 8/17/21: grossly normal CMP/ESR/CMP\par reports a1c 6.0. tchol 174. trig elevated 10/2020\par EKG LVH PACs NSR\par CT brain without significant abnormality. age related changes\par

## 2022-07-13 ENCOUNTER — APPOINTMENT (OUTPATIENT)
Dept: CARDIOLOGY | Facility: CLINIC | Age: 87
End: 2022-07-13

## 2022-07-13 VITALS
OXYGEN SATURATION: 97 % | DIASTOLIC BLOOD PRESSURE: 62 MMHG | WEIGHT: 192 LBS | BODY MASS INDEX: 30.07 KG/M2 | HEART RATE: 57 BPM | TEMPERATURE: 97.8 F | SYSTOLIC BLOOD PRESSURE: 124 MMHG

## 2022-07-13 PROCEDURE — 93291 INTERROG DEV EVAL SCRMS IP: CPT

## 2022-07-13 PROCEDURE — 99024 POSTOP FOLLOW-UP VISIT: CPT

## 2022-07-14 DIAGNOSIS — R55 SYNCOPE AND COLLAPSE: ICD-10-CM

## 2022-07-14 DIAGNOSIS — I47.2 VENTRICULAR TACHYCARDIA: ICD-10-CM

## 2022-08-03 ENCOUNTER — APPOINTMENT (OUTPATIENT)
Dept: CARDIOLOGY | Facility: CLINIC | Age: 87
End: 2022-08-03

## 2022-08-03 ENCOUNTER — NON-APPOINTMENT (OUTPATIENT)
Age: 87
End: 2022-08-03

## 2022-08-03 PROCEDURE — G2066: CPT

## 2022-08-03 PROCEDURE — 93298 REM INTERROG DEV EVAL SCRMS: CPT

## 2022-08-17 ENCOUNTER — NON-APPOINTMENT (OUTPATIENT)
Age: 87
End: 2022-08-17

## 2022-08-27 ENCOUNTER — RX RENEWAL (OUTPATIENT)
Age: 87
End: 2022-08-27

## 2022-08-31 ENCOUNTER — NON-APPOINTMENT (OUTPATIENT)
Age: 87
End: 2022-08-31

## 2022-09-01 ENCOUNTER — OUTPATIENT (OUTPATIENT)
Dept: OUTPATIENT SERVICES | Facility: HOSPITAL | Age: 87
LOS: 1 days | End: 2022-09-01
Payer: MEDICARE

## 2022-09-01 DIAGNOSIS — G47.33 OBSTRUCTIVE SLEEP APNEA (ADULT) (PEDIATRIC): ICD-10-CM

## 2022-09-01 PROCEDURE — 95811 POLYSOM 6/>YRS CPAP 4/> PARM: CPT | Mod: 26

## 2022-09-01 PROCEDURE — 95811 POLYSOM 6/>YRS CPAP 4/> PARM: CPT

## 2022-09-07 ENCOUNTER — APPOINTMENT (OUTPATIENT)
Dept: CARDIOLOGY | Facility: CLINIC | Age: 87
End: 2022-09-07

## 2022-09-07 ENCOUNTER — NON-APPOINTMENT (OUTPATIENT)
Age: 87
End: 2022-09-07

## 2022-09-07 PROCEDURE — G2066: CPT

## 2022-09-07 PROCEDURE — 93298 REM INTERROG DEV EVAL SCRMS: CPT

## 2022-09-16 DIAGNOSIS — R55 SYNCOPE AND COLLAPSE: ICD-10-CM

## 2022-09-16 DIAGNOSIS — I47.2 VENTRICULAR TACHYCARDIA: ICD-10-CM

## 2022-10-03 ENCOUNTER — APPOINTMENT (OUTPATIENT)
Dept: CARDIOLOGY | Facility: CLINIC | Age: 87
End: 2022-10-03

## 2022-10-03 VITALS
OXYGEN SATURATION: 97 % | TEMPERATURE: 96.8 F | SYSTOLIC BLOOD PRESSURE: 146 MMHG | DIASTOLIC BLOOD PRESSURE: 76 MMHG | BODY MASS INDEX: 30.61 KG/M2 | HEIGHT: 67 IN | WEIGHT: 195 LBS | HEART RATE: 60 BPM

## 2022-10-03 PROCEDURE — 99214 OFFICE O/P EST MOD 30 MIN: CPT

## 2022-10-03 RX ORDER — METOPROLOL SUCCINATE 25 MG/1
25 TABLET, EXTENDED RELEASE ORAL DAILY
Qty: 45 | Refills: 3 | Status: DISCONTINUED | COMMUNITY
End: 2022-10-03

## 2022-10-03 NOTE — PHYSICAL EXAM
[Well Developed] : well developed [Well Nourished] : well nourished [No Acute Distress] : no acute distress [Normal Conjunctiva] : normal conjunctiva [Normal Venous Pressure] : normal venous pressure [Normal S1, S2] : normal S1, S2 [No Rub] : no rub [Clear Lung Fields] : clear lung fields [Good Air Entry] : good air entry [No Respiratory Distress] : no respiratory distress  [Soft] : abdomen soft [Non Tender] : non-tender [Normal Bowel Sounds] : normal bowel sounds [Normal Gait] : normal gait [No Edema] : no edema [No Cyanosis] : no cyanosis [No Clubbing] : no clubbing [No Varicosities] : no varicosities [No Rash] : no rash [No Skin Lesions] : no skin lesions [Moves all extremities] : moves all extremities [No Focal Deficits] : no focal deficits [Normal Speech] : normal speech [Alert and Oriented] : alert and oriented [Normal memory] : normal memory [de-identified] : MEG

## 2022-10-03 NOTE — HISTORY OF PRESENT ILLNESS
[FreeTextEntry1] : \par 88 yo M\par Syncope likely orthostatic vs svt\par Metabolic syndrome: obesity mod GOLDIE, htn, hld, dm2\par \par 10/2022: ILR Monitoring. Moderate GOLDIE on cpap now. was only taking losartan 50 instead of bid, so will do that. no further syncope.\par \par 7/2022: interim, had syncope at [facility] [place] in setting of SVT broke with adenosine and dehydration. off tamsulosin now.  ILR put in. echo structurally unremarkable. \par  \par 6/2022: no angina. reports BP is labile. not started cpap, on backorder\par  \par 4/2022 VISIT: interim labwork done. seeing sleep medicine for cpap. pending to start. no more dizziness. on bb. saw EP. no angina. \par \par 12/2021 VISIT: "episode of dizziness correlated with a normal heart rhythm which is good news.  He did have some asymptomatic fast heart rates that I spoke to the electrophysiology and they would like to see him within a couple weeks.  I tasked Xiomara. To control his fast heart rates intermittently I recommend starting low-dose metoprolol at 12.5 and lower the losartan to 50 so his blood pressure does not drop.  Otherwise no change in plan.  I sent in both those prescription changes. See sleep medicine doctor and start CPAP."\par He initially was seen for near syncope which resolved after stopping/decreasing afluzosin.  Orthostatics resolved after this.\par \par \par \par TESTING I REVIEWED TODAY:\par \par 8/2022 LABWORK: cr 1.22. bun 28. a1c 5.7. lipid optimized LDL 56.\par \par 6/2022: Cr 1.1. lipids optimized. normal TFT. a1c 5.9. normal cmp. \par TTE structurally unremarkabe \par \par 3/2022 LABWORK: cr 1.14. normal cmp. a1c 5.8. LDL 59. trig improved to 141. \par \par Lab work 12/2021: Creatinine 1.1.  Normal LFT.  A1c 6.4.  Triglycerides 140.  LDL 58.\par 12/21 Carotid ultrasound: poor \par labwork: trig 416 unable to calculate ldl. a1c 6. normal tft and bnp\par MODERATE GOLDIE\par \par 9/2021 echo and nst without evidence of structural cv abnormality or ischemia. g1dd\par Labs 8/17/21: grossly normal CMP/ESR/CMP\par reports a1c 6.0. tchol 174. trig elevated 10/2020\par EKG LVH PACs NSR\par CT brain without significant abnormality. age related changes\par

## 2022-10-03 NOTE — DISCUSSION/SUMMARY
[FreeTextEntry1] : \par Syncope likely orthostatic vs svt\par Metabolic syndrome: obesity mod GOLDIE, htn, hld, dm2\par \par \par # GOLDIE, HLD, hypertriglyceridemia: Prior uncontrolled now very optimal. trig was 470+ now 141. \par -Continue the prior switched atorva 40 and fenofibrate 160. off aspirin. \par -  diet/weight loss/lifestyle optimization. Mediterranean diet.\par - seeing Dr. Rosales, has mod GOLDIE now is on CPAP \par \par # Syncope: in setting of hypovolemia each time this time with SVT. orthostatic in nature. orthostatics normal when hydrated\par -Stopped afluzosin and tamsulosin and hctz now. on low dose bb. has ILR now.\par -seeing EP\par \par # HTN: labile.\par - losartan 50 BID (today is elevated but was only on losartan 50 instead of BID)\par - off diuretics and pure alpha blockers of any sort\par - keep toprol 12.5.  Orthostatics normal. bp log. \par \par # NIDDM2: very well controlled. continue regimen\par \par \par Return in 6 months. EP in interim. ER precautions given to patient.\par \par

## 2022-10-12 ENCOUNTER — NON-APPOINTMENT (OUTPATIENT)
Age: 87
End: 2022-10-12

## 2022-10-12 ENCOUNTER — APPOINTMENT (OUTPATIENT)
Dept: CARDIOLOGY | Facility: CLINIC | Age: 87
End: 2022-10-12

## 2022-10-12 PROCEDURE — 93298 REM INTERROG DEV EVAL SCRMS: CPT

## 2022-10-12 PROCEDURE — G2066: CPT

## 2022-11-02 ENCOUNTER — NON-APPOINTMENT (OUTPATIENT)
Age: 87
End: 2022-11-02

## 2022-11-02 ENCOUNTER — APPOINTMENT (OUTPATIENT)
Dept: OPHTHALMOLOGY | Facility: CLINIC | Age: 87
End: 2022-11-02

## 2022-11-02 PROCEDURE — 92134 CPTRZ OPH DX IMG PST SGM RTA: CPT

## 2022-11-02 PROCEDURE — 92014 COMPRE OPH EXAM EST PT 1/>: CPT

## 2022-11-16 ENCOUNTER — NON-APPOINTMENT (OUTPATIENT)
Age: 87
End: 2022-11-16

## 2022-11-16 ENCOUNTER — APPOINTMENT (OUTPATIENT)
Dept: CARDIOLOGY | Facility: CLINIC | Age: 87
End: 2022-11-16

## 2022-11-16 PROCEDURE — G2066: CPT

## 2022-11-16 PROCEDURE — 93298 REM INTERROG DEV EVAL SCRMS: CPT

## 2022-12-21 ENCOUNTER — NON-APPOINTMENT (OUTPATIENT)
Age: 87
End: 2022-12-21

## 2022-12-21 ENCOUNTER — APPOINTMENT (OUTPATIENT)
Dept: CARDIOLOGY | Facility: CLINIC | Age: 87
End: 2022-12-21

## 2022-12-21 PROCEDURE — G2066: CPT

## 2022-12-21 PROCEDURE — 93298 REM INTERROG DEV EVAL SCRMS: CPT

## 2023-01-13 ENCOUNTER — APPOINTMENT (OUTPATIENT)
Dept: ELECTROPHYSIOLOGY | Facility: CLINIC | Age: 88
End: 2023-01-13
Payer: MEDICARE

## 2023-01-13 VITALS
OXYGEN SATURATION: 97 % | HEART RATE: 67 BPM | HEIGHT: 67 IN | DIASTOLIC BLOOD PRESSURE: 70 MMHG | BODY MASS INDEX: 31.08 KG/M2 | SYSTOLIC BLOOD PRESSURE: 150 MMHG | WEIGHT: 198 LBS | TEMPERATURE: 97.6 F

## 2023-01-13 PROCEDURE — 93000 ELECTROCARDIOGRAM COMPLETE: CPT

## 2023-01-13 PROCEDURE — 99214 OFFICE O/P EST MOD 30 MIN: CPT

## 2023-02-07 ENCOUNTER — APPOINTMENT (OUTPATIENT)
Dept: RADIOLOGY | Facility: CLINIC | Age: 88
End: 2023-02-07
Payer: MEDICARE

## 2023-02-07 PROCEDURE — 71111 X-RAY EXAM RIBS/CHEST4/> VWS: CPT

## 2023-02-08 ENCOUNTER — APPOINTMENT (OUTPATIENT)
Dept: CARDIOLOGY | Facility: CLINIC | Age: 88
End: 2023-02-08

## 2023-02-17 ENCOUNTER — APPOINTMENT (OUTPATIENT)
Dept: CARDIOLOGY | Facility: CLINIC | Age: 88
End: 2023-02-17
Payer: MEDICARE

## 2023-02-17 ENCOUNTER — NON-APPOINTMENT (OUTPATIENT)
Age: 88
End: 2023-02-17

## 2023-02-17 PROCEDURE — 93298 REM INTERROG DEV EVAL SCRMS: CPT

## 2023-02-17 PROCEDURE — G2066: CPT

## 2023-03-24 ENCOUNTER — NON-APPOINTMENT (OUTPATIENT)
Age: 88
End: 2023-03-24

## 2023-03-24 ENCOUNTER — APPOINTMENT (OUTPATIENT)
Dept: CARDIOLOGY | Facility: CLINIC | Age: 88
End: 2023-03-24
Payer: MEDICARE

## 2023-03-24 PROCEDURE — G2066: CPT

## 2023-03-24 PROCEDURE — 93298 REM INTERROG DEV EVAL SCRMS: CPT

## 2023-04-12 ENCOUNTER — APPOINTMENT (OUTPATIENT)
Dept: CARDIOLOGY | Facility: CLINIC | Age: 88
End: 2023-04-12
Payer: MEDICARE

## 2023-04-12 VITALS
HEART RATE: 64 BPM | BODY MASS INDEX: 30.61 KG/M2 | HEIGHT: 67 IN | SYSTOLIC BLOOD PRESSURE: 140 MMHG | WEIGHT: 195 LBS | OXYGEN SATURATION: 95 % | DIASTOLIC BLOOD PRESSURE: 70 MMHG

## 2023-04-12 PROCEDURE — 99214 OFFICE O/P EST MOD 30 MIN: CPT

## 2023-04-12 NOTE — DISCUSSION/SUMMARY
[FreeTextEntry1] : \par Syncope likely orthostatic vs svt atrial tach, rare nsvt. \par Metabolic syndrome: obesity mod GOLDIE, htn, hld, dm2\par \par \par # GOLDIE, HLD, hypertriglyceridemia: Prior uncontrolled now very optimal. trig was 470+ now optimized. \par -  diet/weight loss/lifestyle optimization. Mediterranean diet.\par - seeing Dr. Rosales, has mod GOLDIE now is on CPAP \par \par # Syncope: in setting of hypovolemia each time this time with SVT. orthostatic in nature. orthostatics normal when hydrated\par -Stopped afluzosin and tamsulosin and hctz now. on low dose bb. has ILR now.\par -seeing EP\par - obtain last ILR recording\par \par # HTN: labile.\par - elevated in office BP today but he did not take meds today yet: he will keep a log at home and if SBP>130 then start full tablet metoprolol and let us know\par - losartan 50 BID\par - off diuretics and pure alpha blockers of any sort\par - keep toprol.  Orthostatics normal. bp log. \par \par # NIDDM2: very well controlled. continue regimen\par \par \par Return in 6 months. EP in interim. ER precautions given to patient.\par \par

## 2023-04-12 NOTE — PHYSICAL EXAM
[Well Developed] : well developed [Well Nourished] : well nourished [No Acute Distress] : no acute distress [Normal Conjunctiva] : normal conjunctiva [Normal Venous Pressure] : normal venous pressure [Normal S1, S2] : normal S1, S2 [No Rub] : no rub [Clear Lung Fields] : clear lung fields [Good Air Entry] : good air entry [No Respiratory Distress] : no respiratory distress  [Soft] : abdomen soft [Non Tender] : non-tender [Normal Bowel Sounds] : normal bowel sounds [Normal Gait] : normal gait [No Edema] : no edema [No Cyanosis] : no cyanosis [No Clubbing] : no clubbing [No Varicosities] : no varicosities [No Rash] : no rash [No Skin Lesions] : no skin lesions [Moves all extremities] : moves all extremities [No Focal Deficits] : no focal deficits [Normal Speech] : normal speech [Alert and Oriented] : alert and oriented [Normal memory] : normal memory [de-identified] : MEG

## 2023-04-12 NOTE — HISTORY OF PRESENT ILLNESS
[FreeTextEntry1] : \par 89 yo M\par Syncope likely orthostatic vs svt. atrial tach, rare nsvt \par Metabolic syndrome: obesity mod GOLDIE, htn, hld, dm2\par \par 4/2023 VISIT; saw EP . labwork well controlled. saw ep increased bb due to rare nsvt. using cpap. had atypical "electric sensation" throughout body and dizziness. did not take bp med today yet. \par \par 10/2022: ILR Monitoring. Moderate GOLDIE on cpap now. was only taking losartan 50 instead of bid, so will do that. no further syncope.\par \par 7/2022: interim, had syncope at [facility] [place], [place] in setting of SVT broke with adenosine and dehydration. off tamsulosin now.  ILR put in. echo structurally unremarkable. \par  \par 6/2022: no angina. reports BP is labile. not started cpap, on backorder\par  \par 4/2022 VISIT: interim labwork done. seeing sleep medicine for cpap. pending to start. no more dizziness. on bb. saw EP. no angina. \par \par 12/2021 VISIT: "episode of dizziness correlated with a normal heart rhythm which is good news.  He did have some asymptomatic fast heart rates that I spoke to the electrophysiology and they would like to see him within a couple weeks.  I tasked Xiomara. To control his fast heart rates intermittently I recommend starting low-dose metoprolol at 12.5 and lower the losartan to 50 so his blood pressure does not drop.  Otherwise no change in plan.  I sent in both those prescription changes. See sleep medicine doctor and start CPAP."\par He initially was seen for near syncope which resolved after stopping/decreasing afluzosin.  Orthostatics resolved after this.\par \par \par \par TESTING I REVIEWED TODAY:\par \par 3/2023 LABWORK: cmp, a1c 5.8, ldl goal. \par 8/2022 LABWORK: cr 1.22. bun 28. a1c 5.7. lipid optimized LDL 56.\par \par 6/2022: Cr 1.1. lipids optimized. normal TFT. a1c 5.9. normal cmp. \par TTE structurally unremarkabe \par \par 3/2022 LABWORK: cr 1.14. normal cmp. a1c 5.8. LDL 59. trig improved to 141. \par \par Lab work 12/2021: Creatinine 1.1.  Normal LFT.  A1c 6.4.  Triglycerides 140.  LDL 58.\par 12/21 Carotid ultrasound: poor \par labwork: trig 416 unable to calculate ldl. a1c 6. normal tft and bnp\par MODERATE GOLDIE\par \par 9/2021 echo and nst without evidence of structural cv abnormality or ischemia. g1dd\par Labs 8/17/21: grossly normal CMP/ESR/CMP\par reports a1c 6.0. tchol 174. trig elevated 10/2020\par EKG LVH PACs NSR\par CT brain without significant abnormality. age related changes\par

## 2023-04-28 ENCOUNTER — APPOINTMENT (OUTPATIENT)
Dept: CARDIOLOGY | Facility: CLINIC | Age: 88
End: 2023-04-28
Payer: MEDICARE

## 2023-04-28 ENCOUNTER — NON-APPOINTMENT (OUTPATIENT)
Age: 88
End: 2023-04-28

## 2023-04-28 PROCEDURE — 93298 REM INTERROG DEV EVAL SCRMS: CPT

## 2023-04-28 PROCEDURE — G2066: CPT

## 2023-05-03 ENCOUNTER — APPOINTMENT (OUTPATIENT)
Dept: OPHTHALMOLOGY | Facility: CLINIC | Age: 88
End: 2023-05-03
Payer: MEDICARE

## 2023-05-03 ENCOUNTER — NON-APPOINTMENT (OUTPATIENT)
Age: 88
End: 2023-05-03

## 2023-05-03 PROCEDURE — 92014 COMPRE OPH EXAM EST PT 1/>: CPT

## 2023-05-03 PROCEDURE — 92134 CPTRZ OPH DX IMG PST SGM RTA: CPT

## 2023-05-18 ENCOUNTER — APPOINTMENT (OUTPATIENT)
Dept: RADIOLOGY | Facility: CLINIC | Age: 88
End: 2023-05-18
Payer: MEDICARE

## 2023-05-18 PROCEDURE — 71046 X-RAY EXAM CHEST 2 VIEWS: CPT

## 2023-06-02 ENCOUNTER — APPOINTMENT (OUTPATIENT)
Dept: CARDIOLOGY | Facility: CLINIC | Age: 88
End: 2023-06-02
Payer: MEDICARE

## 2023-06-02 ENCOUNTER — NON-APPOINTMENT (OUTPATIENT)
Age: 88
End: 2023-06-02

## 2023-06-02 PROCEDURE — G2066: CPT

## 2023-06-02 PROCEDURE — 93298 REM INTERROG DEV EVAL SCRMS: CPT

## 2023-06-05 ENCOUNTER — NON-APPOINTMENT (OUTPATIENT)
Age: 88
End: 2023-06-05

## 2023-07-07 ENCOUNTER — APPOINTMENT (OUTPATIENT)
Dept: CARDIOLOGY | Facility: CLINIC | Age: 88
End: 2023-07-07
Payer: MEDICARE

## 2023-07-07 ENCOUNTER — NON-APPOINTMENT (OUTPATIENT)
Age: 88
End: 2023-07-07

## 2023-07-07 PROCEDURE — G2066: CPT

## 2023-07-07 PROCEDURE — 93298 REM INTERROG DEV EVAL SCRMS: CPT

## 2023-07-24 NOTE — DISCUSSION/SUMMARY
[EKG obtained to assist in diagnosis and management of assessed problem(s)] : EKG obtained to assist in diagnosis and management of assessed problem(s) [FreeTextEntry1] :  ILR report: no AF episode. No Bradycardia\par Patient is overall feeling well without any symptoms of chest pain, shortness of breath or syncope.  He has normal left ventricular function and no evidence of ischemia on a nuclear stress test.\par The patient does not complain of palpitations.   He is on metoprolol 25 mg/day.\par \par The patient was instructed that if he should have symptoms such as worsening dizziness or lightheadedness to report as which case we might have to consider either pacemaker and/or electrophysiology study and catheter ablation.  At this time the patient would prefer not to have anything invasive done.\par \par

## 2023-07-24 NOTE — HISTORY OF PRESENT ILLNESS
[FreeTextEntry1] : The patient is a 87-year-old man who was accompanied by his spouse today.  He is overall feeling very well with no chest pain, shortness of breath, dizziness, lightness, syncope or presyncope\par \par He has an implantable loop monitor the patient had 2 symptom activated events and 2 episodes of tachycardia 1 in December 30, 2022 and the other on January 1, 2023.  Episodes lasted about 18 seconds to have a hair\par \par Continue patient is an 87 patient is an 86-year-old man who was seen in evaluation for an NSVT.\par \par He has had a prior history of hypertension for which she is on losartan.  He was previously on hydrochlorothiazide which was discontinued.  He also had a history of diabetes and is on Metformin.\par \par In the past the patient had symptoms of dizziness/lightheadedness when he stands up.  It occurs for very brief moment.  Never occurs while he is sitting or laying flat.  The symptom has improved significantly over the last few months.  His diuretic was discontinued.  He was previously noted to be orthostatic.\par \par He denies any symptoms of chest pain, shortness of breath or palpitations.\par \par He claims his symptoms gotten better since he has learned how to get up slowly.\par \par The patient had an echocardiogram performed on 9/10/2021 that showed EF 55 to 60%, left atrial diameter 4.5 cm with left atrial volume index 36 cc/m², mild mitral regurgitation, mild diastolic dysfunction no pericardial effusion.\par \par He had a previous stress test performed 9/10/2021 no ECG evidence of ischemia and normal myocardial perfusion scan.\par \par The patient had a Zio patch monitor performed for 13 days starting 12/6/2021 to 12/20/2021: It showed heart rates ranging from 47 bpm 297 bpm.  He has had episodes of a nonsustained wide-complex tachycardia rate of 180 bpm.  He is also had another tachycardia that appears to be similar to his baseline rhythm with rates 180 bpm.\par

## 2023-08-11 ENCOUNTER — APPOINTMENT (OUTPATIENT)
Dept: CARDIOLOGY | Facility: CLINIC | Age: 88
End: 2023-08-11

## 2023-08-16 ENCOUNTER — APPOINTMENT (OUTPATIENT)
Dept: ELECTROPHYSIOLOGY | Facility: CLINIC | Age: 88
End: 2023-08-16
Payer: MEDICARE

## 2023-08-16 VITALS
DIASTOLIC BLOOD PRESSURE: 60 MMHG | WEIGHT: 196 LBS | HEIGHT: 67 IN | BODY MASS INDEX: 30.76 KG/M2 | OXYGEN SATURATION: 96 % | HEART RATE: 61 BPM | SYSTOLIC BLOOD PRESSURE: 126 MMHG

## 2023-08-16 PROCEDURE — 93000 ELECTROCARDIOGRAM COMPLETE: CPT

## 2023-08-16 PROCEDURE — 99214 OFFICE O/P EST MOD 30 MIN: CPT

## 2023-08-16 RX ORDER — METOPROLOL SUCCINATE 25 MG/1
25 TABLET, EXTENDED RELEASE ORAL
Qty: 90 | Refills: 2 | Status: ACTIVE | COMMUNITY
Start: 2021-12-29 | End: 1900-01-01

## 2023-08-16 NOTE — DISCUSSION/SUMMARY
[FreeTextEntry1] : Overall the patient seems to be doing very well without much in the way of symptoms.  He does have these episodes of tachycardia that is noted on implantable loop monitor but usually less than a few minutes with rates up to about 160 bpm.  Patient does not appear to be symptomatic from these episodes.  He is currently on metoprolol succinate ER 25 mg today.  The options for this patient especially if episodes become longer would be to consider catheter ablation or potentially to increase the dose of metoprolol.  We will continue to monitor symptomatic several months and decide whether to increase his beta-blocker based on these episodes. He may not be a good candidate for increase in medication because he occasionally gets dizzy has had prior bradycardia. GOLDIE - using CPAP Remote monitoring and change to a further tachycardia with longer duration of more rapid rates consider catheter ablation. [EKG obtained to assist in diagnosis and management of assessed problem(s)] : EKG obtained to assist in diagnosis and management of assessed problem(s)

## 2023-08-16 NOTE — HISTORY OF PRESENT ILLNESS
[FreeTextEntry1] : The patient is a 88-year-old man who was accompanied by his spouse today.  The patient has no specific complaints.  He did have an episode of rapid heart rate while he was getting treatment. He is overall feeling very well with no chest pain, shortness of breath, dizziness, lightness, syncope or presyncope  He has not felt episodes.  He has an implantable loop monitor and it was noted that he had episodes of tachycardia. He is on metoprolol succinate ER 25 mg/day. Interrogation of the ILR: The patient's had episodes of tachycardia to the rate between 140 to 160 bpm lasting a few minutes up to 30 minutes.  He is not fully aware of these episodes.  On the rhythm strip it appears to be a long RP.   He has had a prior history of hypertension for which she is on losartan.  He was previously on hydrochlorothiazide which was discontinued.  He also had a history of diabetes and is on Metformin.  In the past the patient had symptoms of dizziness/lightheadedness when he stands up.  It occurs for very brief moment.  Never occurs while he is sitting or laying flat.  The symptom has improved significantly over the last few months.  His diuretic was discontinued.  He was previously noted to be orthostatic.  He claims his symptoms gotten better since he has learned how to get up slowly.  The patient had an echocardiogram performed on 9/10/2021 that showed EF 55 to 60%, left atrial diameter 4.5 cm with left atrial volume index 36 cc/m, mild mitral regurgitation, mild diastolic dysfunction no pericardial effusion.  He had a previous stress test performed 9/10/2021 no ECG evidence of ischemia and normal myocardial perfusion scan.  The patient had a Zio patch monitor performed for 13 days starting 12/6/2021 to 12/20/2021: It showed heart rates ranging from 47 bpm 297 bpm.  He has had episodes of a nonsustained wide-complex tachycardia rate of 180 bpm.  He is also had another tachycardia that appears to be similar to his baseline rhythm with rates 180 bpm.  He has a prior history of GOLDIE and he is compliant with his CPAP mask.

## 2023-08-16 NOTE — REVIEW OF SYSTEMS
[Weight Gain (___ Lbs)] : no recent weight gain [Blurry Vision] : no blurred vision [Sore Throat] : no sore throat [SOB] : no shortness of breath [Dyspnea on exertion] : not dyspnea during exertion [Chest Discomfort] : no chest discomfort [Palpitations] : no palpitations [Syncope] : no syncope [Cough] : no cough [Abdominal Pain] : no abdominal pain [Rash] : no rash [Dizziness] : dizziness [Confusion] : no confusion was observed [Under Stress] : not under stress [Easy Bleeding] : no tendency for easy bleeding

## 2023-09-20 ENCOUNTER — NON-APPOINTMENT (OUTPATIENT)
Age: 88
End: 2023-09-20

## 2023-09-20 ENCOUNTER — APPOINTMENT (OUTPATIENT)
Dept: CARDIOLOGY | Facility: CLINIC | Age: 88
End: 2023-09-20
Payer: MEDICARE

## 2023-09-21 PROCEDURE — G2066: CPT

## 2023-09-21 PROCEDURE — 93298 REM INTERROG DEV EVAL SCRMS: CPT

## 2023-10-02 ENCOUNTER — NON-APPOINTMENT (OUTPATIENT)
Age: 88
End: 2023-10-02

## 2023-10-16 ENCOUNTER — APPOINTMENT (OUTPATIENT)
Dept: CARDIOLOGY | Facility: CLINIC | Age: 88
End: 2023-10-16
Payer: MEDICARE

## 2023-10-16 ENCOUNTER — NON-APPOINTMENT (OUTPATIENT)
Age: 88
End: 2023-10-16

## 2023-10-16 VITALS
DIASTOLIC BLOOD PRESSURE: 82 MMHG | HEIGHT: 67 IN | SYSTOLIC BLOOD PRESSURE: 170 MMHG | HEART RATE: 63 BPM | WEIGHT: 196 LBS | BODY MASS INDEX: 30.76 KG/M2 | OXYGEN SATURATION: 96 %

## 2023-10-16 DIAGNOSIS — E78.1 PURE HYPERGLYCERIDEMIA: ICD-10-CM

## 2023-10-16 DIAGNOSIS — R55 SYNCOPE AND COLLAPSE: ICD-10-CM

## 2023-10-16 DIAGNOSIS — R06.09 OTHER FORMS OF DYSPNEA: ICD-10-CM

## 2023-10-16 PROCEDURE — 93000 ELECTROCARDIOGRAM COMPLETE: CPT

## 2023-10-16 PROCEDURE — 99214 OFFICE O/P EST MOD 30 MIN: CPT

## 2023-10-25 ENCOUNTER — APPOINTMENT (OUTPATIENT)
Dept: CARDIOLOGY | Facility: CLINIC | Age: 88
End: 2023-10-25
Payer: MEDICARE

## 2023-10-25 ENCOUNTER — NON-APPOINTMENT (OUTPATIENT)
Age: 88
End: 2023-10-25

## 2023-10-26 PROCEDURE — G2066: CPT

## 2023-10-26 PROCEDURE — 93298 REM INTERROG DEV EVAL SCRMS: CPT

## 2023-11-08 ENCOUNTER — NON-APPOINTMENT (OUTPATIENT)
Age: 88
End: 2023-11-08

## 2023-11-08 ENCOUNTER — APPOINTMENT (OUTPATIENT)
Dept: OPHTHALMOLOGY | Facility: CLINIC | Age: 88
End: 2023-11-08
Payer: MEDICARE

## 2023-11-08 PROCEDURE — 99214 OFFICE O/P EST MOD 30 MIN: CPT

## 2023-11-08 PROCEDURE — 92134 CPTRZ OPH DX IMG PST SGM RTA: CPT

## 2023-11-18 LAB — HBA1C MFR BLD HPLC: 5.8

## 2023-11-29 ENCOUNTER — APPOINTMENT (OUTPATIENT)
Dept: CARDIOLOGY | Facility: CLINIC | Age: 88
End: 2023-11-29
Payer: MEDICARE

## 2023-11-29 ENCOUNTER — NON-APPOINTMENT (OUTPATIENT)
Age: 88
End: 2023-11-29

## 2023-11-30 PROCEDURE — G2066: CPT | Mod: NC

## 2023-11-30 PROCEDURE — 93298 REM INTERROG DEV EVAL SCRMS: CPT

## 2024-01-02 ENCOUNTER — APPOINTMENT (OUTPATIENT)
Dept: CARDIOLOGY | Facility: CLINIC | Age: 89
End: 2024-01-02
Payer: MEDICARE

## 2024-01-02 ENCOUNTER — NON-APPOINTMENT (OUTPATIENT)
Age: 89
End: 2024-01-02

## 2024-01-03 PROCEDURE — 93298 REM INTERROG DEV EVAL SCRMS: CPT

## 2024-01-23 ENCOUNTER — APPOINTMENT (OUTPATIENT)
Dept: OPHTHALMOLOGY | Facility: HOSPITAL | Age: 89
End: 2024-01-23

## 2024-01-24 ENCOUNTER — APPOINTMENT (OUTPATIENT)
Dept: OPHTHALMOLOGY | Facility: CLINIC | Age: 89
End: 2024-01-24

## 2024-01-31 ENCOUNTER — APPOINTMENT (OUTPATIENT)
Dept: OPHTHALMOLOGY | Facility: CLINIC | Age: 89
End: 2024-01-31

## 2024-02-06 ENCOUNTER — APPOINTMENT (OUTPATIENT)
Dept: CARDIOLOGY | Facility: CLINIC | Age: 89
End: 2024-02-06
Payer: MEDICARE

## 2024-02-06 ENCOUNTER — NON-APPOINTMENT (OUTPATIENT)
Age: 89
End: 2024-02-06

## 2024-02-07 PROCEDURE — 93298 REM INTERROG DEV EVAL SCRMS: CPT

## 2024-02-14 ENCOUNTER — APPOINTMENT (OUTPATIENT)
Dept: OPHTHALMOLOGY | Facility: CLINIC | Age: 89
End: 2024-02-14

## 2024-02-16 ENCOUNTER — APPOINTMENT (OUTPATIENT)
Dept: UROLOGY | Facility: CLINIC | Age: 89
End: 2024-02-16
Payer: MEDICARE

## 2024-02-16 VITALS
SYSTOLIC BLOOD PRESSURE: 192 MMHG | OXYGEN SATURATION: 95 % | DIASTOLIC BLOOD PRESSURE: 79 MMHG | BODY MASS INDEX: 29.82 KG/M2 | WEIGHT: 190 LBS | HEIGHT: 67 IN | TEMPERATURE: 96.4 F | HEART RATE: 61 BPM

## 2024-02-16 DIAGNOSIS — R31.0 GROSS HEMATURIA: ICD-10-CM

## 2024-02-16 PROCEDURE — 99204 OFFICE O/P NEW MOD 45 MIN: CPT

## 2024-02-16 NOTE — LETTER BODY
[Dear  ___] : Dear  [unfilled], [Consult Letter:] : I had the pleasure of evaluating your patient, [unfilled]. [Please see my note below.] : Please see my note below. [Consult Closing:] : Thank you very much for allowing me to participate in the care of this patient.  If you have any questions, please do not hesitate to contact me. [Sincerely,] : Sincerely, [FreeTextEntry3] : Ayaz Pendleton, DO Genitourinary Medicine

## 2024-02-16 NOTE — HISTORY OF PRESENT ILLNESS
[FreeTextEntry1] : Mr. ANUSHKA BRADYZO 89 year old  M  PMH HLD, BPH, DM, GERD, HTN, kidney stones and PSH cholecystectomy, tonsillectomy, TURP.  Pt comes in today bc about 1 week ago was having decreased stream and urgency. Pt having "sand" in his urine with some blood.

## 2024-02-23 ENCOUNTER — RESULT REVIEW (OUTPATIENT)
Age: 89
End: 2024-02-23

## 2024-02-23 ENCOUNTER — APPOINTMENT (OUTPATIENT)
Dept: CT IMAGING | Facility: CLINIC | Age: 89
End: 2024-02-23
Payer: MEDICARE

## 2024-02-23 PROCEDURE — 74178 CT ABD&PLV WO CNTR FLWD CNTR: CPT

## 2024-02-26 LAB
APPEARANCE: CLEAR
BACTERIA UR CULT: NORMAL
BACTERIA: NEGATIVE /HPF
BILIRUBIN URINE: NEGATIVE
BLOOD URINE: NEGATIVE
CALCIUM OXALATE CRYSTALS: PRESENT
CAST: 0 /LPF
COLOR: YELLOW
EPITHELIAL CELLS: 6 /HPF
GLUCOSE QUALITATIVE U: NEGATIVE MG/DL
KETONES URINE: NEGATIVE MG/DL
LEUKOCYTE ESTERASE URINE: NEGATIVE
MICROSCOPIC-UA: NORMAL
MUCUS: PRESENT
NITRITE URINE: NEGATIVE
PH URINE: 5.5
PROTEIN URINE: 30 MG/DL
RED BLOOD CELLS URINE: NORMAL /HPF
REVIEW: NORMAL
SPECIFIC GRAVITY URINE: 1.02
UROBILINOGEN URINE: 0.2 MG/DL
WHITE BLOOD CELLS URINE: 1 /HPF

## 2024-02-27 ENCOUNTER — APPOINTMENT (OUTPATIENT)
Dept: OPHTHALMOLOGY | Facility: HOSPITAL | Age: 89
End: 2024-02-27

## 2024-02-28 ENCOUNTER — APPOINTMENT (OUTPATIENT)
Dept: OPHTHALMOLOGY | Facility: CLINIC | Age: 89
End: 2024-02-28

## 2024-03-01 ENCOUNTER — APPOINTMENT (OUTPATIENT)
Dept: OPHTHALMOLOGY | Facility: CLINIC | Age: 89
End: 2024-03-01

## 2024-03-06 ENCOUNTER — APPOINTMENT (OUTPATIENT)
Dept: OPHTHALMOLOGY | Facility: CLINIC | Age: 89
End: 2024-03-06

## 2024-03-08 ENCOUNTER — APPOINTMENT (OUTPATIENT)
Dept: UROLOGY | Facility: CLINIC | Age: 89
End: 2024-03-08
Payer: MEDICARE

## 2024-03-08 VITALS
OXYGEN SATURATION: 97 % | BODY MASS INDEX: 29.82 KG/M2 | WEIGHT: 190 LBS | HEART RATE: 62 BPM | TEMPERATURE: 96.8 F | SYSTOLIC BLOOD PRESSURE: 150 MMHG | DIASTOLIC BLOOD PRESSURE: 78 MMHG | HEIGHT: 67 IN

## 2024-03-08 PROCEDURE — 99214 OFFICE O/P EST MOD 30 MIN: CPT

## 2024-03-08 NOTE — PHYSICAL EXAM
[Normal Appearance] : normal appearance [Well Groomed] : well groomed [General Appearance - In No Acute Distress] : no acute distress [Edema] : no peripheral edema [Exaggerated Use Of Accessory Muscles For Inspiration] : no accessory muscle use [Respiration, Rhythm And Depth] : normal respiratory rhythm and effort [Abdomen Tenderness] : non-tender [Abdomen Soft] : soft [Costovertebral Angle Tenderness] : no ~M costovertebral angle tenderness [Urinary Bladder Findings] : the bladder was normal on palpation [Normal Station and Gait] : the gait and station were normal for the patient's age [] : no rash [No Focal Deficits] : no focal deficits [Oriented To Time, Place, And Person] : oriented to person, place, and time [Affect] : the affect was normal [Mood] : the mood was normal

## 2024-03-08 NOTE — HISTORY OF PRESENT ILLNESS
[FreeTextEntry1] :   Pt comes in today bc about 1 week ago was having decreased stream and urgency. Pt having "sand" in his urine with some blood. Pt states urination has signifyingly improved and is happy with his urination.   2/23/24 CT Right kidney 2 mm nonobstructing lower pole stone. No hydronephrosis or hydroureter. 2 bladder stones each measuring 1.5 cm. 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 Images reviewed

## 2024-03-08 NOTE — ASSESSMENT
[FreeTextEntry1] : Pt does not want to have surgery at this time. Pt understands the risks.   Plan c/w flomax fu 3 months

## 2024-03-11 ENCOUNTER — NON-APPOINTMENT (OUTPATIENT)
Age: 89
End: 2024-03-11

## 2024-03-11 ENCOUNTER — APPOINTMENT (OUTPATIENT)
Dept: CARDIOLOGY | Facility: CLINIC | Age: 89
End: 2024-03-11
Payer: MEDICARE

## 2024-03-11 PROCEDURE — 93298 REM INTERROG DEV EVAL SCRMS: CPT

## 2024-03-27 ENCOUNTER — APPOINTMENT (OUTPATIENT)
Dept: UROLOGY | Facility: CLINIC | Age: 89
End: 2024-03-27
Payer: MEDICARE

## 2024-03-27 VITALS
SYSTOLIC BLOOD PRESSURE: 138 MMHG | TEMPERATURE: 97.3 F | HEART RATE: 69 BPM | HEIGHT: 67 IN | DIASTOLIC BLOOD PRESSURE: 71 MMHG | WEIGHT: 190 LBS | BODY MASS INDEX: 29.82 KG/M2

## 2024-03-27 PROCEDURE — 99203 OFFICE O/P NEW LOW 30 MIN: CPT

## 2024-03-27 PROCEDURE — 99213 OFFICE O/P EST LOW 20 MIN: CPT

## 2024-03-27 NOTE — ASSESSMENT
[FreeTextEntry1] : Personally reviewed patient's CT scan and explained to the patient that his prostate is enlarged up to 100 mL.  His prostate is also protrudes into the bladder in the form of the middle lobe.  He also has 2 stones a 1.5 1.7 cm in the bladder.  I do not believe that the stones are really interferes with his patient's urination because of the shape of the prostate.  I recommended first to try change tamsulosin for the silodosin 8 mg that was found to be much more beneficial for the prostate enlargement. I see patient and will return to discussion.

## 2024-03-27 NOTE — HISTORY OF PRESENT ILLNESS
[FreeTextEntry1] : 89-year-old patient who was sent to [facility] by Dr. Pendleton came with his daughter to discuss the possible options of treatment. Patient is known to suffer from BPH and lower urinary tract symptoms. With the tamsulosin he describes his urine still weak. Have a tenderness in the lower abdominal area. Had laser vaporization of prostate and transurethral resection of prostate and resume in the past

## 2024-03-27 NOTE — PHYSICAL EXAM
[Normal Appearance] : normal appearance [General Appearance - In No Acute Distress] : no acute distress [Well Groomed] : well groomed [Edema] : no peripheral edema [Abdomen Soft] : soft [Exaggerated Use Of Accessory Muscles For Inspiration] : no accessory muscle use [Respiration, Rhythm And Depth] : normal respiratory rhythm and effort [Costovertebral Angle Tenderness] : no ~M costovertebral angle tenderness [Abdomen Tenderness] : non-tender [Urinary Bladder Findings] : the bladder was normal on palpation [] : no rash [Normal Station and Gait] : the gait and station were normal for the patient's age [No Focal Deficits] : no focal deficits [Affect] : the affect was normal [Oriented To Time, Place, And Person] : oriented to person, place, and time [Mood] : the mood was normal [No Palpable Adenopathy] : no palpable adenopathy

## 2024-03-28 ENCOUNTER — APPOINTMENT (OUTPATIENT)
Dept: OPHTHALMOLOGY | Facility: CLINIC | Age: 89
End: 2024-03-28

## 2024-03-29 RX ORDER — SILODOSIN 8 MG/1
8 CAPSULE ORAL DAILY
Qty: 90 | Refills: 3 | Status: ACTIVE | COMMUNITY
Start: 2024-03-27 | End: 1900-01-01

## 2024-04-13 PROBLEM — Z87.891 FORMER SMOKER: Status: ACTIVE | Noted: 2021-08-25

## 2024-04-13 PROBLEM — R55 NEAR SYNCOPE: Status: ACTIVE | Noted: 2021-08-25

## 2024-04-13 PROBLEM — E66.9 OBESITY (BMI 30-39.9): Status: ACTIVE | Noted: 2021-08-25

## 2024-04-13 PROBLEM — E11.9 TYPE 2 DIABETES MELLITUS: Status: ACTIVE | Noted: 2021-08-25

## 2024-04-15 ENCOUNTER — NON-APPOINTMENT (OUTPATIENT)
Age: 89
End: 2024-04-15

## 2024-04-15 ENCOUNTER — APPOINTMENT (OUTPATIENT)
Dept: CARDIOLOGY | Facility: CLINIC | Age: 89
End: 2024-04-15
Payer: MEDICARE

## 2024-04-15 VITALS
HEIGHT: 67 IN | SYSTOLIC BLOOD PRESSURE: 132 MMHG | OXYGEN SATURATION: 97 % | WEIGHT: 194 LBS | DIASTOLIC BLOOD PRESSURE: 74 MMHG | HEART RATE: 54 BPM | BODY MASS INDEX: 30.45 KG/M2

## 2024-04-15 DIAGNOSIS — E11.9 TYPE 2 DIABETES MELLITUS W/OUT COMPLICATIONS: ICD-10-CM

## 2024-04-15 DIAGNOSIS — R39.9 UNSPECIFIED SYMPTOMS AND SIGNS INVOLVING THE GENITOURINARY SYSTEM: ICD-10-CM

## 2024-04-15 DIAGNOSIS — Z87.891 PERSONAL HISTORY OF NICOTINE DEPENDENCE: ICD-10-CM

## 2024-04-15 DIAGNOSIS — E66.9 OBESITY, UNSPECIFIED: ICD-10-CM

## 2024-04-15 DIAGNOSIS — R55 SYNCOPE AND COLLAPSE: ICD-10-CM

## 2024-04-15 PROCEDURE — 99214 OFFICE O/P EST MOD 30 MIN: CPT

## 2024-04-15 RX ORDER — TAMSULOSIN HYDROCHLORIDE 0.4 MG/1
0.4 CAPSULE ORAL
Qty: 30 | Refills: 3 | Status: DISCONTINUED | COMMUNITY
Start: 2024-02-16 | End: 2024-04-15

## 2024-04-15 RX ORDER — CHOLESTYRAMINE 4 G/5.5G
4 POWDER, FOR SUSPENSION ORAL
Qty: 693 | Refills: 0 | Status: DISCONTINUED | COMMUNITY
Start: 2021-11-29 | End: 2024-04-15

## 2024-04-15 NOTE — HISTORY OF PRESENT ILLNESS
[FreeTextEntry1] : 89 year old male here for cv follow up for:  Syncope likely orthostatic  Svt. atrial tach, rare nsvt. Has loop recorder.  Metabolic syndrome: obesity mod GOLDIE, htn, hld, dm2 BPH LUTS kidney stones  4/2024 VISIT: feels well. bp good. urology switching tamsulosin to sildosin  labwork optimized including lipid end of 2023    10/2023 VISIT: saw EP. no hospitalizations. bp elevated but did not take meds today yet. no angina.   4/2023 VISIT; saw EP . labwork well controlled. saw ep increased bb due to rare nsvt. using cpap. had atypical "electric sensation" throughout body and dizziness. did not take bp med today yet.   10/2022: ILR Monitoring. Moderate GOLDIE on cpap now. was only taking losartan 50 instead of bid, so will do that. no further syncope.  7/2022: interim, had syncope at [facility] in setting of SVT broke with adenosine and dehydration. off tamsulosin now.  ILR put in. echo structurally unremarkable.    6/2022: no angina. reports BP is labile. not started cpap, on backorder   4/2022 VISIT: interim labwork done. seeing sleep medicine for cpap. pending to start. no more dizziness. on bb. saw EP. no angina.   12/2021 VISIT: "episode of dizziness correlated with a normal heart rhythm which is good news.  He did have some asymptomatic fast heart rates that I spoke to the electrophysiology and they would like to see him within a couple weeks.  I tasked Xiomara. To control his fast heart rates intermittently I recommend starting low-dose metoprolol at 12.5 and lower the losartan to 50 so his blood pressure does not drop.  Otherwise no change in plan.  I sent in both those prescription changes. See sleep medicine doctor and start CPAP." He initially was seen for near syncope which resolved after stopping/decreasing afluzosin.  Orthostatics resolved after this.  TESTING I REVIEWED TODAY: excluding above 3/2023 LABWORK: cmp, a1c 5.8, ldl goal.  8/2022 LABWORK: cr 1.22. bun 28. a1c 5.7. lipid optimized LDL 56.  6/2022: Cr 1.1. lipids optimized. normal TFT. a1c 5.9. normal cmp.  TTE structurally unremarkabe   3/2022 LABWORK: cr 1.14. normal cmp. a1c 5.8. LDL 59. trig improved to 141.   Lab work 12/2021: Creatinine 1.1.  Normal LFT.  A1c 6.4.  Triglycerides 140.  LDL 58. 12/21 Carotid ultrasound: poor  labwork: trig 416 unable to calculate ldl. a1c 6. normal tft and bnp MODERATE GOLDIE  9/2021 echo and nst without evidence of structural cv abnormality or ischemia. g1dd Labs 8/17/21: grossly normal CMP/ESR/CMP reports a1c 6.0. tchol 174. trig elevated 10/2020 EKG LVH PACs NSR CT brain without significant abnormality. age related changes

## 2024-04-15 NOTE — DISCUSSION/SUMMARY
[FreeTextEntry1] : Syncope likely orthostatic. He does have fairly asymptomatic rhythm issues (svt atrial tach, rare nsvt). Has loop recorder.  Metabolic syndrome: obesity mod GOLDIE, htn, hld, dm2  BP has been elevated but improving. His goal can be la little lenient given prior orthostasis and his age.   - He will keep a bp log and call me and if average SBP>135 mm Hg I'll start amlodipine 2.5 - losartan 50 BID - off diuretics - urology prefers alpha blocker for prostate issues - keep toprol.  Orthostatics normal. bp log.   # GOLDIE, HLD, hypertriglyceridemia: Prior uncontrolled now very optimal. trig was 470+ now optimized.  - diet/weight loss/lifestyle optimization. Mediterranean diet. - saw Dr. Rosales, has mod GOLDIE now is on CPAP   # Syncope: in setting of hypovolemia each time. orthostatics normal when hydrated. seeing EP. ILR in place.   Return in 6 months. Labwork from PCP. EP. ER precautions given to patient.

## 2024-04-15 NOTE — REASON FOR VISIT
[Symptom and Test Evaluation] : symptom and test evaluation [CV Risk Factors and Non-Cardiac Disease] : CV risk factors and non-cardiac disease [FreeTextEntry3] : Dr. Tramaine Mai

## 2024-04-15 NOTE — PHYSICAL EXAM
[Well Developed] : well developed [Well Nourished] : well nourished [No Acute Distress] : no acute distress [Normal Conjunctiva] : normal conjunctiva [Normal Venous Pressure] : normal venous pressure [Normal S1, S2] : normal S1, S2 [No Rub] : no rub [Clear Lung Fields] : clear lung fields [Good Air Entry] : good air entry [No Respiratory Distress] : no respiratory distress  [Soft] : abdomen soft [Non Tender] : non-tender [Normal Bowel Sounds] : normal bowel sounds [Normal Gait] : normal gait [No Edema] : no edema [No Cyanosis] : no cyanosis [No Clubbing] : no clubbing [No Varicosities] : no varicosities [No Rash] : no rash [No Skin Lesions] : no skin lesions [Moves all extremities] : moves all extremities [No Focal Deficits] : no focal deficits [Normal Speech] : normal speech [Alert and Oriented] : alert and oriented [Normal memory] : normal memory [de-identified] : MEG

## 2024-04-16 ENCOUNTER — APPOINTMENT (OUTPATIENT)
Dept: CARDIOLOGY | Facility: CLINIC | Age: 89
End: 2024-04-16
Payer: MEDICARE

## 2024-04-16 PROCEDURE — 93298 REM INTERROG DEV EVAL SCRMS: CPT

## 2024-05-08 ENCOUNTER — NON-APPOINTMENT (OUTPATIENT)
Age: 89
End: 2024-05-08

## 2024-05-08 ENCOUNTER — APPOINTMENT (OUTPATIENT)
Dept: OPHTHALMOLOGY | Facility: CLINIC | Age: 89
End: 2024-05-08
Payer: MEDICARE

## 2024-05-08 PROCEDURE — 99213 OFFICE O/P EST LOW 20 MIN: CPT

## 2024-05-08 PROCEDURE — 92136 OPHTHALMIC BIOMETRY: CPT | Mod: RT

## 2024-05-09 ENCOUNTER — APPOINTMENT (OUTPATIENT)
Dept: UROLOGY | Facility: CLINIC | Age: 89
End: 2024-05-09
Payer: MEDICARE

## 2024-05-09 VITALS
DIASTOLIC BLOOD PRESSURE: 66 MMHG | WEIGHT: 194 LBS | BODY MASS INDEX: 30.45 KG/M2 | HEIGHT: 67 IN | TEMPERATURE: 97.3 F | HEART RATE: 74 BPM | SYSTOLIC BLOOD PRESSURE: 137 MMHG

## 2024-05-09 DIAGNOSIS — N40.1 BENIGN PROSTATIC HYPERPLASIA WITH LOWER URINARY TRACT SYMPMS: ICD-10-CM

## 2024-05-09 DIAGNOSIS — N21.0 CALCULUS IN BLADDER: ICD-10-CM

## 2024-05-09 DIAGNOSIS — N13.8 BENIGN PROSTATIC HYPERPLASIA WITH LOWER URINARY TRACT SYMPMS: ICD-10-CM

## 2024-05-09 PROCEDURE — 99214 OFFICE O/P EST MOD 30 MIN: CPT

## 2024-05-09 NOTE — ASSESSMENT
[FreeTextEntry1] : Patient is on the silodosin 8 mg and feels much better.  His stream is strong today and there is no problem to empty his bladder. I checked his postvoiding residual that was 60 ml. We discussed that now it is a good time to do that cystoscopy with laser lithotripsy of the stones without any intervention to the prostate. Patient and his daughter agreed and will proceed with the operation.

## 2024-05-09 NOTE — HISTORY OF PRESENT ILLNESS
[FreeTextEntry1] : 89-year-old patient presented today for the follow-up.   Patient came with his daughter. Patient is known to suffer from BPH and lower urinary tract symptoms. With the tamsulosin he describes his urine still weak. Had laser vaporization of prostate and transurethral resection of prostate and resume in the past. We reviewed his radiological exams and found that he still has enlarged prostate and 2 stones in the bladder 1 of approximately 7 mm and 1 stone or 1.5 cm. We started treatment with the silodosin 8 mg and patient reported significant improvement.

## 2024-05-20 ENCOUNTER — NON-APPOINTMENT (OUTPATIENT)
Age: 89
End: 2024-05-20

## 2024-05-21 ENCOUNTER — NON-APPOINTMENT (OUTPATIENT)
Age: 89
End: 2024-05-21

## 2024-05-21 ENCOUNTER — APPOINTMENT (OUTPATIENT)
Dept: CARDIOLOGY | Facility: CLINIC | Age: 89
End: 2024-05-21
Payer: MEDICARE

## 2024-05-21 ENCOUNTER — APPOINTMENT (OUTPATIENT)
Dept: OPHTHALMOLOGY | Facility: HOSPITAL | Age: 89
End: 2024-05-21
Payer: MEDICARE

## 2024-05-21 PROCEDURE — 66984 XCAPSL CTRC RMVL W/O ECP: CPT | Mod: RT

## 2024-05-21 PROCEDURE — 93298 REM INTERROG DEV EVAL SCRMS: CPT

## 2024-05-22 ENCOUNTER — APPOINTMENT (OUTPATIENT)
Dept: OPHTHALMOLOGY | Facility: CLINIC | Age: 89
End: 2024-05-22
Payer: MEDICARE

## 2024-05-22 ENCOUNTER — NON-APPOINTMENT (OUTPATIENT)
Age: 89
End: 2024-05-22

## 2024-05-22 PROCEDURE — 99024 POSTOP FOLLOW-UP VISIT: CPT

## 2024-05-24 ENCOUNTER — NON-APPOINTMENT (OUTPATIENT)
Age: 89
End: 2024-05-24

## 2024-05-29 ENCOUNTER — NON-APPOINTMENT (OUTPATIENT)
Age: 89
End: 2024-05-29

## 2024-05-29 ENCOUNTER — APPOINTMENT (OUTPATIENT)
Dept: OPHTHALMOLOGY | Facility: CLINIC | Age: 89
End: 2024-05-29
Payer: MEDICARE

## 2024-05-29 PROCEDURE — 99024 POSTOP FOLLOW-UP VISIT: CPT

## 2024-05-31 PROBLEM — Z01.810 PRE-OPERATIVE CARDIOVASCULAR EXAMINATION: Status: ACTIVE | Noted: 2024-05-31

## 2024-06-03 ENCOUNTER — APPOINTMENT (OUTPATIENT)
Dept: CARDIOLOGY | Facility: CLINIC | Age: 89
End: 2024-06-03
Payer: MEDICARE

## 2024-06-03 ENCOUNTER — NON-APPOINTMENT (OUTPATIENT)
Age: 89
End: 2024-06-03

## 2024-06-03 VITALS
OXYGEN SATURATION: 95 % | HEIGHT: 67 IN | DIASTOLIC BLOOD PRESSURE: 60 MMHG | SYSTOLIC BLOOD PRESSURE: 140 MMHG | BODY MASS INDEX: 29.19 KG/M2 | WEIGHT: 186 LBS | HEART RATE: 83 BPM

## 2024-06-03 DIAGNOSIS — I47.10 SUPRAVENTRICULAR TACHYCARDIA, UNSPECIFIED: ICD-10-CM

## 2024-06-03 DIAGNOSIS — G47.33 OBSTRUCTIVE SLEEP APNEA (ADULT) (PEDIATRIC): ICD-10-CM

## 2024-06-03 DIAGNOSIS — Z01.810 ENCOUNTER FOR PREPROCEDURAL CARDIOVASCULAR EXAMINATION: ICD-10-CM

## 2024-06-03 DIAGNOSIS — E78.5 HYPERLIPIDEMIA, UNSPECIFIED: ICD-10-CM

## 2024-06-03 DIAGNOSIS — I10 ESSENTIAL (PRIMARY) HYPERTENSION: ICD-10-CM

## 2024-06-03 PROCEDURE — 93000 ELECTROCARDIOGRAM COMPLETE: CPT

## 2024-06-03 PROCEDURE — 99214 OFFICE O/P EST MOD 30 MIN: CPT

## 2024-06-03 NOTE — PHYSICAL EXAM
[Well Developed] : well developed [Well Nourished] : well nourished [No Acute Distress] : no acute distress [Normal Conjunctiva] : normal conjunctiva [Normal Venous Pressure] : normal venous pressure [Normal S1, S2] : normal S1, S2 [No Rub] : no rub [Clear Lung Fields] : clear lung fields [Good Air Entry] : good air entry [No Respiratory Distress] : no respiratory distress  [Soft] : abdomen soft [Non Tender] : non-tender [Normal Bowel Sounds] : normal bowel sounds [Normal Gait] : normal gait [No Edema] : no edema [No Cyanosis] : no cyanosis [No Clubbing] : no clubbing [No Varicosities] : no varicosities [No Rash] : no rash [No Skin Lesions] : no skin lesions [Moves all extremities] : moves all extremities [No Focal Deficits] : no focal deficits [Normal Speech] : normal speech [Alert and Oriented] : alert and oriented [Normal memory] : normal memory [de-identified] : MEG [de-identified] : off balance

## 2024-06-03 NOTE — HISTORY OF PRESENT ILLNESS
[FreeTextEntry1] : 89 year old male here for cv follow up for:  Syncope likely orthostatic  Svt. atrial tach, rare nsvt. Has loop recorder.  Metabolic syndrome: obesity mod GOLDIE on cpap, htn, hld, dm2 BPH LUTS kidney stones  6/2024 VISIT: pre operative evaluation for urologic procedures. no angina. ekg unchanged.   4/2024 VISIT: feels well. bp good. urology switching tamsulosin to sildosin  labwork optimized including lipid end of 2023    10/2023 VISIT: saw EP. no hospitalizations. bp elevated but did not take meds today yet. no angina.   4/2023 VISIT; saw EP . labwork well controlled. saw ep increased bb due to rare nsvt. using cpap. had atypical "electric sensation" throughout body and dizziness. did not take bp med today yet.   10/2022: ILR Monitoring. Moderate GOLDIE on cpap now. was only taking losartan 50 instead of bid, so will do that. no further syncope.  7/2022: interim, had syncope at [facility] in setting of SVT broke with adenosine and dehydration. off tamsulosin now.  ILR put in. echo structurally unremarkable.    6/2022: no angina. reports BP is labile. not started cpap, on backorder   4/2022 VISIT: interim labwork done. seeing sleep medicine for cpap. pending to start. no more dizziness. on bb. saw EP. no angina.   12/2021 VISIT: "episode of dizziness correlated with a normal heart rhythm which is good news.  He did have some asymptomatic fast heart rates that I spoke to the electrophysiology and they would like to see him within a couple weeks.  I tasked Xiomara. To control his fast heart rates intermittently I recommend starting low-dose metoprolol at 12.5 and lower the losartan to 50 so his blood pressure does not drop.  Otherwise no change in plan.  I sent in both those prescription changes. See sleep medicine doctor and start CPAP." He initially was seen for near syncope which resolved after stopping/decreasing afluzosin.  Orthostatics resolved after this.  **TESTING I REVIEWED TODAY: excluding above 3/2023 LABWORK: cmp, a1c 5.8, ldl goal.  8/2022 LABWORK: cr 1.22. bun 28. a1c 5.7. lipid optimized LDL 56.  6/2022: Cr 1.1. lipids optimized. normal TFT. a1c 5.9. normal cmp.  TTE structurally unremarkabe   3/2022 LABWORK: cr 1.14. normal cmp. a1c 5.8. LDL 59. trig improved to 141.   Lab work 12/2021: Creatinine 1.1.  Normal LFT.  A1c 6.4.  Triglycerides 140.  LDL 58. 12/21 Carotid ultrasound: poor  labwork: trig 416 unable to calculate ldl. a1c 6. normal tft and bnp MODERATE GOLDIE  9/2021 echo and nst without evidence of structural cv abnormality or ischemia. g1dd Labs 8/17/21: grossly normal CMP/ESR/CMP reports a1c 6.0. tchol 174. trig elevated 10/2020 EKG LVH PACs NSR CT brain without significant abnormality. age related changes

## 2024-06-03 NOTE — DISCUSSION/SUMMARY
[FreeTextEntry1] : 89 year old male here for pre operative cardiovascular evaluation prior to urologic procedures. METS>4. No recent CV hospitalizations. No recent ACS, decompensated heart failure, preclusive arrhythmias.  No further cardiac testing required prior to procedure. Continue cardiovascular medications as tolerated shane-procedurally. Patient is not at a prohibitive risk for AMI or CHF shane-procedurally. Avoid hypovolemia. Make note of his GOLDIE on CPAP diagnosis.   Rest as per recent office note. His BP goal can be la little lenient given prior orthostasis and his age. He will keep a bp log and call me and if average SBP>135 mm Hg I'll start amlodipine 2.5. I would lower sodium intake tremendously.   Set up with Dr. Terry he wishes to discuss loop explant given new copay. I think it's reasonable.  Follow as scheduled in 4 months. request labwork from PCP. EP. ER precautions given to patient.

## 2024-06-07 ENCOUNTER — APPOINTMENT (OUTPATIENT)
Dept: UROLOGY | Facility: CLINIC | Age: 89
End: 2024-06-07

## 2024-06-07 ENCOUNTER — APPOINTMENT (OUTPATIENT)
Dept: UROLOGY | Facility: HOSPITAL | Age: 89
End: 2024-06-07

## 2024-06-07 ENCOUNTER — RESULT REVIEW (OUTPATIENT)
Age: 89
End: 2024-06-07

## 2024-06-07 RX ORDER — NITROFURANTOIN (MONOHYDRATE/MACROCRYSTALS) 25; 75 MG/1; MG/1
100 CAPSULE ORAL
Qty: 10 | Refills: 0 | Status: ACTIVE | COMMUNITY
Start: 2024-06-07 | End: 1900-01-01

## 2024-06-14 ENCOUNTER — APPOINTMENT (OUTPATIENT)
Dept: UROLOGY | Facility: CLINIC | Age: 89
End: 2024-06-14
Payer: MEDICARE

## 2024-06-14 VITALS
DIASTOLIC BLOOD PRESSURE: 69 MMHG | HEART RATE: 66 BPM | HEIGHT: 67 IN | WEIGHT: 186 LBS | SYSTOLIC BLOOD PRESSURE: 107 MMHG | BODY MASS INDEX: 29.19 KG/M2 | TEMPERATURE: 94.4 F

## 2024-06-14 DIAGNOSIS — Z98.890 OTHER SPECIFIED POSTPROCEDURAL STATES: ICD-10-CM

## 2024-06-14 DIAGNOSIS — Z87.438 PERSONAL HISTORY OF OTHER DISEASES OF MALE GENITAL ORGANS: ICD-10-CM

## 2024-06-14 PROCEDURE — 99024 POSTOP FOLLOW-UP VISIT: CPT

## 2024-06-14 NOTE — HISTORY OF PRESENT ILLNESS
[FreeTextEntry1] : 89-year-old patient presented today for the follow-up.   Recently we did that cystoscopy with laser lithotripsy for the 2 stones of 1.5 1.8 cm in the greatest diameter and 1 stone a 1 cm in the greatest diameter Patient tolerated procedure well and reported today significant improvement in his ability to empty her bladder and also reported on the strong stream.

## 2024-06-14 NOTE — ASSESSMENT
[FreeTextEntry1] : Today patient presented and reported significant improvement in his urination. I recommended patient to continue with the silodosin 8 mg once a day. See patient in 3 months.  I recommended to make appointment with Dr. Pendleton

## 2024-06-19 ENCOUNTER — NON-APPOINTMENT (OUTPATIENT)
Age: 89
End: 2024-06-19

## 2024-06-19 ENCOUNTER — APPOINTMENT (OUTPATIENT)
Dept: OPHTHALMOLOGY | Facility: CLINIC | Age: 89
End: 2024-06-19
Payer: MEDICARE

## 2024-06-19 PROCEDURE — 99024 POSTOP FOLLOW-UP VISIT: CPT

## 2024-06-24 ENCOUNTER — NON-APPOINTMENT (OUTPATIENT)
Age: 89
End: 2024-06-24

## 2024-06-25 ENCOUNTER — APPOINTMENT (OUTPATIENT)
Dept: CARDIOLOGY | Facility: CLINIC | Age: 89
End: 2024-06-25
Payer: MEDICARE

## 2024-06-25 PROCEDURE — 93298 REM INTERROG DEV EVAL SCRMS: CPT

## 2024-07-05 ENCOUNTER — APPOINTMENT (OUTPATIENT)
Dept: ELECTROPHYSIOLOGY | Facility: CLINIC | Age: 89
End: 2024-07-05
Payer: MEDICARE

## 2024-07-05 VITALS
DIASTOLIC BLOOD PRESSURE: 64 MMHG | BODY MASS INDEX: 29.98 KG/M2 | HEART RATE: 57 BPM | OXYGEN SATURATION: 96 % | HEIGHT: 67 IN | WEIGHT: 191 LBS | SYSTOLIC BLOOD PRESSURE: 156 MMHG

## 2024-07-05 DIAGNOSIS — I47.10 SUPRAVENTRICULAR TACHYCARDIA, UNSPECIFIED: ICD-10-CM

## 2024-07-05 DIAGNOSIS — R42 DIZZINESS AND GIDDINESS: ICD-10-CM

## 2024-07-05 DIAGNOSIS — G47.33 OBSTRUCTIVE SLEEP APNEA (ADULT) (PEDIATRIC): ICD-10-CM

## 2024-07-05 PROCEDURE — 99214 OFFICE O/P EST MOD 30 MIN: CPT

## 2024-07-05 PROCEDURE — 93000 ELECTROCARDIOGRAM COMPLETE: CPT

## 2024-07-30 ENCOUNTER — APPOINTMENT (OUTPATIENT)
Dept: CARDIOLOGY | Facility: CLINIC | Age: 89
End: 2024-07-30

## 2024-08-02 ENCOUNTER — NON-APPOINTMENT (OUTPATIENT)
Age: 89
End: 2024-08-02

## 2024-09-17 ENCOUNTER — APPOINTMENT (OUTPATIENT)
Dept: UROLOGY | Facility: CLINIC | Age: 89
End: 2024-09-17
Payer: MEDICARE

## 2024-09-17 VITALS — DIASTOLIC BLOOD PRESSURE: 69 MMHG | TEMPERATURE: 97.5 F | HEART RATE: 58 BPM | SYSTOLIC BLOOD PRESSURE: 168 MMHG

## 2024-09-17 DIAGNOSIS — R39.9 UNSPECIFIED SYMPTOMS AND SIGNS INVOLVING THE GENITOURINARY SYSTEM: ICD-10-CM

## 2024-09-17 DIAGNOSIS — N13.8 BENIGN PROSTATIC HYPERPLASIA WITH LOWER URINARY TRACT SYMPMS: ICD-10-CM

## 2024-09-17 DIAGNOSIS — N40.1 BENIGN PROSTATIC HYPERPLASIA WITH LOWER URINARY TRACT SYMPMS: ICD-10-CM

## 2024-09-17 PROCEDURE — 99213 OFFICE O/P EST LOW 20 MIN: CPT

## 2024-09-17 NOTE — HISTORY OF PRESENT ILLNESS
[FreeTextEntry1] : As per Dr Raza last note 89-year-old patient presented today for the follow-up.   Recently we did that cystoscopy with laser lithotripsy for the 2 stones of 1.5 1.8 cm in the greatest diameter and 1 stone a 1 cm in the greatest diameter Patient tolerated procedure well and reported today significant improvement in his ability to empty her bladder and also reported on the strong stream.  Pt comes in follow up BPH. Pt doing well on finasteride. Off rapaflo. Pt happy with his urination.

## 2024-09-23 ENCOUNTER — NON-APPOINTMENT (OUTPATIENT)
Age: 89
End: 2024-09-23

## 2024-09-23 ENCOUNTER — APPOINTMENT (OUTPATIENT)
Dept: OPHTHALMOLOGY | Facility: CLINIC | Age: 89
End: 2024-09-23
Payer: MEDICARE

## 2024-09-23 PROCEDURE — 92134 CPTRZ OPH DX IMG PST SGM RTA: CPT

## 2024-09-23 PROCEDURE — 99214 OFFICE O/P EST MOD 30 MIN: CPT

## 2024-10-08 ENCOUNTER — APPOINTMENT (OUTPATIENT)
Dept: CARDIOLOGY | Facility: CLINIC | Age: 89
End: 2024-10-08
Payer: MEDICARE

## 2024-10-08 ENCOUNTER — NON-APPOINTMENT (OUTPATIENT)
Age: 89
End: 2024-10-08

## 2024-10-08 VITALS
WEIGHT: 190 LBS | DIASTOLIC BLOOD PRESSURE: 72 MMHG | BODY MASS INDEX: 29.76 KG/M2 | HEART RATE: 61 BPM | OXYGEN SATURATION: 95 % | SYSTOLIC BLOOD PRESSURE: 142 MMHG

## 2024-10-08 DIAGNOSIS — Z87.891 PERSONAL HISTORY OF NICOTINE DEPENDENCE: ICD-10-CM

## 2024-10-08 DIAGNOSIS — E78.5 HYPERLIPIDEMIA, UNSPECIFIED: ICD-10-CM

## 2024-10-08 DIAGNOSIS — Z01.810 ENCOUNTER FOR PREPROCEDURAL CARDIOVASCULAR EXAMINATION: ICD-10-CM

## 2024-10-08 DIAGNOSIS — I10 ESSENTIAL (PRIMARY) HYPERTENSION: ICD-10-CM

## 2024-10-08 DIAGNOSIS — R06.09 OTHER FORMS OF DYSPNEA: ICD-10-CM

## 2024-10-08 DIAGNOSIS — E78.1 PURE HYPERGLYCERIDEMIA: ICD-10-CM

## 2024-10-08 DIAGNOSIS — I47.10 SUPRAVENTRICULAR TACHYCARDIA, UNSPECIFIED: ICD-10-CM

## 2024-10-08 PROCEDURE — 99214 OFFICE O/P EST MOD 30 MIN: CPT

## 2024-10-23 ENCOUNTER — APPOINTMENT (OUTPATIENT)
Dept: OPHTHALMOLOGY | Facility: CLINIC | Age: 89
End: 2024-10-23
Payer: MEDICARE

## 2024-10-23 ENCOUNTER — NON-APPOINTMENT (OUTPATIENT)
Age: 89
End: 2024-10-23

## 2024-10-23 PROCEDURE — 92134 CPTRZ OPH DX IMG PST SGM RTA: CPT

## 2024-10-23 PROCEDURE — 99213 OFFICE O/P EST LOW 20 MIN: CPT

## 2024-12-11 ENCOUNTER — NON-APPOINTMENT (OUTPATIENT)
Age: 88
End: 2024-12-11

## 2024-12-11 ENCOUNTER — APPOINTMENT (OUTPATIENT)
Dept: OPHTHALMOLOGY | Facility: CLINIC | Age: 88
End: 2024-12-11
Payer: MEDICARE

## 2024-12-11 PROCEDURE — 92134 CPTRZ OPH DX IMG PST SGM RTA: CPT

## 2024-12-11 PROCEDURE — 99213 OFFICE O/P EST LOW 20 MIN: CPT

## 2025-01-22 ENCOUNTER — APPOINTMENT (OUTPATIENT)
Dept: ELECTROPHYSIOLOGY | Facility: CLINIC | Age: 89
End: 2025-01-22
Payer: COMMERCIAL

## 2025-01-22 VITALS
DIASTOLIC BLOOD PRESSURE: 72 MMHG | HEART RATE: 67 BPM | HEIGHT: 67 IN | SYSTOLIC BLOOD PRESSURE: 140 MMHG | OXYGEN SATURATION: 95 % | WEIGHT: 194 LBS | BODY MASS INDEX: 30.45 KG/M2

## 2025-01-22 VITALS
WEIGHT: 194 LBS | HEART RATE: 67 BPM | SYSTOLIC BLOOD PRESSURE: 140 MMHG | HEIGHT: 67 IN | BODY MASS INDEX: 30.45 KG/M2 | DIASTOLIC BLOOD PRESSURE: 72 MMHG | OXYGEN SATURATION: 95 %

## 2025-01-22 DIAGNOSIS — G47.33 OBSTRUCTIVE SLEEP APNEA (ADULT) (PEDIATRIC): ICD-10-CM

## 2025-01-22 DIAGNOSIS — I47.10 SUPRAVENTRICULAR TACHYCARDIA, UNSPECIFIED: ICD-10-CM

## 2025-01-22 PROCEDURE — 99203 OFFICE O/P NEW LOW 30 MIN: CPT

## 2025-03-04 ENCOUNTER — APPOINTMENT (OUTPATIENT)
Dept: UROLOGY | Facility: CLINIC | Age: 89
End: 2025-03-04
Payer: MEDICARE

## 2025-03-04 VITALS
HEIGHT: 67 IN | DIASTOLIC BLOOD PRESSURE: 76 MMHG | WEIGHT: 194 LBS | SYSTOLIC BLOOD PRESSURE: 153 MMHG | BODY MASS INDEX: 30.45 KG/M2 | HEART RATE: 67 BPM | TEMPERATURE: 98.2 F

## 2025-03-04 DIAGNOSIS — R39.9 UNSPECIFIED SYMPTOMS AND SIGNS INVOLVING THE GENITOURINARY SYSTEM: ICD-10-CM

## 2025-03-04 PROCEDURE — 99213 OFFICE O/P EST LOW 20 MIN: CPT

## 2025-03-27 ENCOUNTER — APPOINTMENT (OUTPATIENT)
Dept: OPHTHALMOLOGY | Facility: CLINIC | Age: 89
End: 2025-03-27
Payer: MEDICARE

## 2025-03-27 ENCOUNTER — NON-APPOINTMENT (OUTPATIENT)
Age: 89
End: 2025-03-27

## 2025-03-27 PROCEDURE — 92134 CPTRZ OPH DX IMG PST SGM RTA: CPT

## 2025-03-27 PROCEDURE — 99213 OFFICE O/P EST LOW 20 MIN: CPT

## 2025-04-08 ENCOUNTER — APPOINTMENT (OUTPATIENT)
Dept: CARDIOLOGY | Facility: CLINIC | Age: 89
End: 2025-04-08
Payer: MEDICARE

## 2025-04-08 VITALS
DIASTOLIC BLOOD PRESSURE: 74 MMHG | WEIGHT: 193 LBS | HEART RATE: 56 BPM | SYSTOLIC BLOOD PRESSURE: 130 MMHG | OXYGEN SATURATION: 94 % | BODY MASS INDEX: 30.23 KG/M2

## 2025-04-08 DIAGNOSIS — Z87.891 PERSONAL HISTORY OF NICOTINE DEPENDENCE: ICD-10-CM

## 2025-04-08 DIAGNOSIS — R55 SYNCOPE AND COLLAPSE: ICD-10-CM

## 2025-04-08 DIAGNOSIS — I47.10 SUPRAVENTRICULAR TACHYCARDIA, UNSPECIFIED: ICD-10-CM

## 2025-04-08 DIAGNOSIS — E66.9 OBESITY, UNSPECIFIED: ICD-10-CM

## 2025-04-08 DIAGNOSIS — G47.33 OBSTRUCTIVE SLEEP APNEA (ADULT) (PEDIATRIC): ICD-10-CM

## 2025-04-08 DIAGNOSIS — E11.9 TYPE 2 DIABETES MELLITUS W/OUT COMPLICATIONS: ICD-10-CM

## 2025-04-08 DIAGNOSIS — I10 ESSENTIAL (PRIMARY) HYPERTENSION: ICD-10-CM

## 2025-04-08 PROCEDURE — 99204 OFFICE O/P NEW MOD 45 MIN: CPT

## 2025-06-26 ENCOUNTER — APPOINTMENT (OUTPATIENT)
Dept: OPHTHALMOLOGY | Facility: CLINIC | Age: 89
End: 2025-06-26

## 2025-09-03 ENCOUNTER — APPOINTMENT (OUTPATIENT)
Dept: OPHTHALMOLOGY | Facility: CLINIC | Age: 89
End: 2025-09-03
Payer: MEDICARE

## 2025-09-03 ENCOUNTER — NON-APPOINTMENT (OUTPATIENT)
Age: 89
End: 2025-09-03

## 2025-09-03 PROCEDURE — 92014 COMPRE OPH EXAM EST PT 1/>: CPT

## 2025-09-03 PROCEDURE — 92134 CPTRZ OPH DX IMG PST SGM RTA: CPT
